# Patient Record
Sex: MALE | Race: WHITE | NOT HISPANIC OR LATINO | Employment: OTHER | URBAN - METROPOLITAN AREA
[De-identification: names, ages, dates, MRNs, and addresses within clinical notes are randomized per-mention and may not be internally consistent; named-entity substitution may affect disease eponyms.]

---

## 2018-04-03 ENCOUNTER — TRANSCRIBE ORDERS (OUTPATIENT)
Dept: ADMINISTRATIVE | Facility: HOSPITAL | Age: 68
End: 2018-04-03

## 2018-04-03 DIAGNOSIS — R10.812 LEFT UPPER QUADRANT ABDOMINAL TENDERNESS WITHOUT REBOUND TENDERNESS: ICD-10-CM

## 2018-04-03 DIAGNOSIS — R10.813 RIGHT LOWER QUADRANT ABDOMINAL TENDERNESS WITHOUT REBOUND TENDERNESS: ICD-10-CM

## 2018-04-03 DIAGNOSIS — R19.8 ABDOMINAL FULLNESS: ICD-10-CM

## 2018-04-03 DIAGNOSIS — R10.31 RIGHT LOWER QUADRANT ABDOMINAL PAIN: ICD-10-CM

## 2018-04-03 DIAGNOSIS — R10.32 LEFT LOWER QUADRANT PAIN: Primary | ICD-10-CM

## 2018-04-10 ENCOUNTER — HOSPITAL ENCOUNTER (OUTPATIENT)
Dept: CT IMAGING | Facility: HOSPITAL | Age: 68
Discharge: HOME | End: 2018-04-10
Admitting: INTERNAL MEDICINE

## 2018-04-10 DIAGNOSIS — R10.813 RIGHT LOWER QUADRANT ABDOMINAL TENDERNESS WITHOUT REBOUND TENDERNESS: ICD-10-CM

## 2018-04-10 DIAGNOSIS — R10.32 LEFT LOWER QUADRANT PAIN: ICD-10-CM

## 2018-04-10 DIAGNOSIS — R19.8 ABDOMINAL FULLNESS: ICD-10-CM

## 2018-04-10 DIAGNOSIS — R10.812 LEFT UPPER QUADRANT ABDOMINAL TENDERNESS WITHOUT REBOUND TENDERNESS: ICD-10-CM

## 2018-04-10 DIAGNOSIS — R10.31 RIGHT LOWER QUADRANT ABDOMINAL PAIN: ICD-10-CM

## 2018-04-10 LAB — CREAT BLDA-MCNC: 1.2 MG/DL (ref 0.6–1.3)

## 2018-04-10 PROCEDURE — 82565 ASSAY OF CREATININE: CPT

## 2018-04-10 PROCEDURE — 25010000002 IOPAMIDOL 61 % SOLUTION: Performed by: INTERNAL MEDICINE

## 2018-04-10 PROCEDURE — 74177 CT ABD & PELVIS W/CONTRAST: CPT

## 2018-04-10 RX ADMIN — IOPAMIDOL 85 ML: 612 INJECTION, SOLUTION INTRAVENOUS at 08:05

## 2018-04-18 ENCOUNTER — TRANSCRIBE ORDERS (OUTPATIENT)
Dept: ADMINISTRATIVE | Facility: HOSPITAL | Age: 68
End: 2018-04-18

## 2018-04-18 DIAGNOSIS — R93.89 ABNORMAL CT SCAN: Primary | ICD-10-CM

## 2018-05-10 ENCOUNTER — TELEPHONE (OUTPATIENT)
Dept: ORTHOPEDIC SURGERY | Facility: CLINIC | Age: 68
End: 2018-05-10

## 2018-05-18 ENCOUNTER — OFFICE VISIT (OUTPATIENT)
Dept: ORTHOPEDIC SURGERY | Facility: CLINIC | Age: 68
End: 2018-05-18

## 2018-05-18 VITALS — TEMPERATURE: 97.6 F | HEIGHT: 70 IN | BODY MASS INDEX: 24.34 KG/M2 | WEIGHT: 170 LBS

## 2018-05-18 DIAGNOSIS — M47.812 NECK ARTHRITIS: ICD-10-CM

## 2018-05-18 DIAGNOSIS — M25.512 CHRONIC LEFT SHOULDER PAIN: Primary | ICD-10-CM

## 2018-05-18 DIAGNOSIS — G89.29 CHRONIC LEFT SHOULDER PAIN: Primary | ICD-10-CM

## 2018-05-18 PROCEDURE — 72040 X-RAY EXAM NECK SPINE 2-3 VW: CPT | Performed by: ORTHOPAEDIC SURGERY

## 2018-05-18 PROCEDURE — 99203 OFFICE O/P NEW LOW 30 MIN: CPT | Performed by: ORTHOPAEDIC SURGERY

## 2018-05-18 PROCEDURE — 73030 X-RAY EXAM OF SHOULDER: CPT | Performed by: ORTHOPAEDIC SURGERY

## 2018-05-18 RX ORDER — HEPATITIS A VACCINE 1440 [IU]/ML
INJECTION, SUSPENSION INTRAMUSCULAR
COMMUNITY
Start: 2018-05-11 | End: 2018-07-13

## 2018-05-18 RX ORDER — FINASTERIDE 5 MG/1
5 TABLET, FILM COATED ORAL DAILY
COMMUNITY
Start: 2016-01-05

## 2018-05-21 NOTE — PROGRESS NOTES
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     No current facility-administered medications on file prior to visit.      Current Medications:  Scheduled Meds:  Continuous Infusions:  No current facility-administered medications for this visit.   PRN Meds:.    History reviewed. No pertinent past medical history.   History reviewed. No pertinent surgical history.     Social History     Occupational History   • Not on file.     Social History Main Topics   • Smoking status: Not on file   • Smokeless tobacco: Not on file   • Alcohol use Not on file   • Drug use: Unknown   • Sexual activity: Not on file    Social History     Social History Narrative   • No narrative on file      History reviewed. No pertinent family history.          Review of Systems: 14 point review of systems are remarkable for the pertinent positives listed in the chart by the patient the remainder are negative    Review of Systems      Physical Exam: 68 y.o. male  General Appearance:    Alert, cooperative, in no acute distress                 Vitals:     "05/18/18 1344   Temp: 97.6 °F (36.4 °C)   Weight: 77.1 kg (170 lb)   Height: 177.8 cm (70\")      Patient is alert and read ×3 no acute distress appears her above-listed at height weight and age.  Affect is normal respiratory rate is normal unlabored. Heart rate regular rate rhythm, sclera, dentition and hearing are normal for the purpose of this exam.    Ortho Exam Physical exam of the left shoulder reveals no overlying skin changes no lymphedema no lymphadenopathy.  The patient can actively flex to 180, abduction is similar external rotation is to 50, internal rotation to the upper lumbar spine.  Rotator cuff strength is 4+ to 5 over 5 with isometric strength testing without symptoms.  The patient hasa normal elbow exam.  Patient has good distal pulses  He does has some decrease in cervical range of motion in all directions with some reproduction of his left upper extremity pain he has a negative Spurling's he is neurologically intact guarded many muscle test which is 5 over 5 sensation appears to be intact with exception of some subjective tingling no overlying skin changes  Procedures          Radiology:   AP, Scapular Y and Axillary Lateral of the left shoulder were ordered/reviewed to evauate shoulder pain.  I've no comparative films he has some mild acromial clavicular arthritis no other acute bony pathology is seen AP and lateral the cervical spine were also taken to evaluate his symptoms have no comparative films he has marked degenerative changes in the cervical spine no obvious listhesis  Imaging Results (most recent)     Procedure Component Value Units Date/Time    XR Spine Cervical 2 View [[id number]] Resulted:  05/18/18 1449     Updated:  05/18/18 1449    Impression:       Ordering physician's impression is located in the Encounter Note dated 05/18/18. X-ray performed in the DR room.      XR Shoulder 2+ View Left [[id number]] Resulted:  05/18/18 1344     Updated:  05/18/18 1344    Impression:       " Ordering physician's impression is located in the Encounter Note dated 05/18/18. X-ray performed in the DR room.          Assessment/Plan:Left upper extremity pain I really think this is coming from his neck plan is to get him on a therapeutic dose of anti-inflammatories I will start him into some physical therapy and I will also have him see Dr. Joel

## 2018-05-25 ENCOUNTER — ANESTHESIA (OUTPATIENT)
Dept: GASTROENTEROLOGY | Facility: HOSPITAL | Age: 68
End: 2018-05-25

## 2018-05-25 ENCOUNTER — HOSPITAL ENCOUNTER (OUTPATIENT)
Facility: HOSPITAL | Age: 68
Setting detail: HOSPITAL OUTPATIENT SURGERY
Discharge: HOME | End: 2018-05-25
Attending: INTERNAL MEDICINE | Admitting: INTERNAL MEDICINE

## 2018-05-25 ENCOUNTER — PREP FOR SURGERY (OUTPATIENT)
Dept: OTHER | Facility: HOSPITAL | Age: 68
End: 2018-05-25

## 2018-05-25 ENCOUNTER — ANESTHESIA EVENT (OUTPATIENT)
Dept: GASTROENTEROLOGY | Facility: HOSPITAL | Age: 68
End: 2018-05-25

## 2018-05-25 VITALS
OXYGEN SATURATION: 100 % | SYSTOLIC BLOOD PRESSURE: 129 MMHG | BODY MASS INDEX: 23.25 KG/M2 | RESPIRATION RATE: 18 BRPM | DIASTOLIC BLOOD PRESSURE: 82 MMHG | HEART RATE: 74 BPM | TEMPERATURE: 97.8 F | HEIGHT: 70 IN | WEIGHT: 162.38 LBS

## 2018-05-25 DIAGNOSIS — Z12.11 SCREENING FOR COLON CANCER: Primary | ICD-10-CM

## 2018-05-25 PROCEDURE — G0121 COLON CA SCRN NOT HI RSK IND: HCPCS | Performed by: INTERNAL MEDICINE

## 2018-05-25 PROCEDURE — 25010000002 PROPOFOL 10 MG/ML EMULSION: Performed by: ANESTHESIOLOGY

## 2018-05-25 RX ORDER — PROPOFOL 10 MG/ML
VIAL (ML) INTRAVENOUS CONTINUOUS PRN
Status: DISCONTINUED | OUTPATIENT
Start: 2018-05-25 | End: 2018-05-25 | Stop reason: SURG

## 2018-05-25 RX ORDER — PROPOFOL 10 MG/ML
VIAL (ML) INTRAVENOUS AS NEEDED
Status: DISCONTINUED | OUTPATIENT
Start: 2018-05-25 | End: 2018-05-25 | Stop reason: SURG

## 2018-05-25 RX ORDER — SODIUM CHLORIDE, SODIUM LACTATE, POTASSIUM CHLORIDE, CALCIUM CHLORIDE 600; 310; 30; 20 MG/100ML; MG/100ML; MG/100ML; MG/100ML
30 INJECTION, SOLUTION INTRAVENOUS CONTINUOUS PRN
Status: DISCONTINUED | OUTPATIENT
Start: 2018-05-25 | End: 2018-05-25 | Stop reason: HOSPADM

## 2018-05-25 RX ORDER — LIDOCAINE HYDROCHLORIDE 20 MG/ML
INJECTION, SOLUTION INFILTRATION; PERINEURAL AS NEEDED
Status: DISCONTINUED | OUTPATIENT
Start: 2018-05-25 | End: 2018-05-25 | Stop reason: SURG

## 2018-05-25 RX ORDER — SODIUM CHLORIDE 0.9 % (FLUSH) 0.9 %
1-10 SYRINGE (ML) INJECTION AS NEEDED
Status: DISCONTINUED | OUTPATIENT
Start: 2018-05-25 | End: 2018-05-25 | Stop reason: HOSPADM

## 2018-05-25 RX ADMIN — SODIUM CHLORIDE, POTASSIUM CHLORIDE, SODIUM LACTATE AND CALCIUM CHLORIDE 30 ML/HR: 600; 310; 30; 20 INJECTION, SOLUTION INTRAVENOUS at 07:37

## 2018-05-25 RX ADMIN — LIDOCAINE HYDROCHLORIDE 60 MG: 20 INJECTION, SOLUTION INFILTRATION; PERINEURAL at 09:01

## 2018-05-25 RX ADMIN — PROPOFOL 130 MG: 10 INJECTION, EMULSION INTRAVENOUS at 09:01

## 2018-05-25 RX ADMIN — PROPOFOL 120 MCG/KG/MIN: 10 INJECTION, EMULSION INTRAVENOUS at 09:02

## 2018-05-25 NOTE — ANESTHESIA POSTPROCEDURE EVALUATION
"Patient: Sandro Galindo    Procedure Summary     Date:  05/25/18 Room / Location:  [facility] ENDOSCOPY 6 / [facility] ENDOSCOPY    Anesthesia Start:  0859 Anesthesia Stop:  0917    Procedure:  COLONOSCOPY TO CECUM AND TERMINAL ILEUM (N/A ) Diagnosis:  Screening for colon cancer    Surgeon:  Frandy Flowers MD Provider:  Pasquale Muir MD    Anesthesia Type:  MAC ASA Status:  2          Anesthesia Type: MAC  Last vitals  BP   105/60 (05/25/18 0918)   Temp   36.6 °C (97.8 °F) (05/25/18 0918)   Pulse   76 (05/25/18 0918)   Resp   18 (05/25/18 0918)     SpO2   99 % (05/25/18 0918)     Post Anesthesia Care and Evaluation    Patient location during evaluation: bedside  Patient participation: complete - patient participated  Level of consciousness: awake  Pain management: adequate  Airway patency: patent  Anesthetic complications: No anesthetic complications    Cardiovascular status: acceptable  Respiratory status: acceptable  Hydration status: acceptable    Comments: /60 (BP Location: Left arm, Patient Position: Lying)   Pulse 76   Temp 36.6 °C (97.8 °F) (Oral)   Resp 18   Ht 177.8 cm (70\")   Wt 73.7 kg (162 lb 6 oz)   SpO2 99%   BMI 23.30 kg/m²         "

## 2018-05-25 NOTE — H&P
"Patient Care Team:  Rick Garza Jr., MD as PCP - General    CHIEF COMPLAINT: Screening for CRC    HISTORY OF PRESENT ILLNESS:    Last exam >10years, no Family history      Past Medical History:   Diagnosis Date   • Arthritis    • Vitamin D deficiency      Past Surgical History:   Procedure Laterality Date   • COLONOSCOPY     • CYST REMOVAL       History reviewed. No pertinent family history.  Social History   Substance Use Topics   • Smoking status: Never Smoker   • Smokeless tobacco: Not on file   • Alcohol use Yes      Comment: social     Prescriptions Prior to Admission   Medication Sig Dispense Refill Last Dose   • Cholecalciferol (VITAMIN D PO) Take 1,000 mg by mouth.   5/23/2018   • finasteride (PROSCAR) 5 MG tablet Take 5 mg by mouth Daily.   5/23/2018   • HAVRIX 1440 EL U/ML vaccine    5/4/2018   • Multiple Vitamins-Minerals (MENS 50+ MULTI VITAMIN/MIN PO) Take  by mouth.   5/23/2018     Allergies:  Sulfa antibiotics    REVIEW OF SYSTEMS:  Please see the above history of present illness for pertinent positives and negatives.  The remainder of the patient's systems have been reviewed and are negative.     Vital Signs  Temp:  [97.8 °F (36.6 °C)] 97.8 °F (36.6 °C)  Heart Rate:  [64] 64  Resp:  [16] 16  BP: (139)/(74) 139/74    Flowsheet Rows      First Filed Value   Admission Height  177.8 cm (70\") Documented at 05/25/2018 0728   Admission Weight  73.7 kg (162 lb 6 oz) Documented at 05/25/2018 0728           Physical Exam:  Physical Exam   Constitutional: Patient appears well-developed and well-nourished and in no acute distress   HEENT:   Head: Normocephalic and atraumatic.   Eyes:  Pupils are equal, round, and reactive to light. EOM are intact. Sclera are anicteric and non-injected.  Mouth and Throat: Patient has moist mucous membranes. Oropharynx is clear of any erythema or exudate.     Neck: Neck supple. No JVD present. No thyromegaly present. No lymphadenopathy present.  Cardiovascular: Regular rate, " regular rhythm, S1 normal and S2 normal.  Exam reveals no gallop and no friction rub.  No murmur heard.  Pulmonary/Chest: Lungs are clear to auscultation bilaterally. No respiratory distress. No wheezes. No rhonchi. No rales.   Abdominal: Soft. Bowel sounds are normal. No distension and no mass. There is no hepatosplenomegaly. There is no tenderness.   Musculoskeletal: Normal Muscle tone  Extremities: No edema. Pulses are palpable in all 4 extremities.  Neurological: Patient is alert and oriented to person, place, and time. Cranial nerves II-XII are grossly intact with no focal deficits.  Skin: Skin is warm. No rash noted. Nails show no clubbing.  No cyanosis or erythema.     Results Review:    I reviewed the patient's new clinical results.  Lab Results (most recent)     None          Imaging Results (most recent)     None        reviewed    ECG/EMG Results (most recent)     None        reviewed    Assessment/Plan     Screening for CRC/Colonoscopy    I discussed the patients findings and my recommendations with patient.     Frandy Flowers MD  05/25/18  8:44 AM    Time: 10 min prior to procedure.

## 2018-05-25 NOTE — ANESTHESIA PREPROCEDURE EVALUATION
Anesthesia Evaluation     Patient summary reviewed and Nursing notes reviewed   no history of anesthetic complications:  NPO Solid Status: > 6 hours  NPO Liquid Status: > 6 hours           Airway   Mallampati: II  TM distance: >3 FB  Neck ROM: full  no difficulty expected and No difficulty expected  Dental - normal exam     Pulmonary - negative pulmonary ROS and normal exam    breath sounds clear to auscultation  (-) rhonchi, decreased breath sounds, wheezes, rales, stridor  Cardiovascular - negative cardio ROS and normal exam    NYHA Classification: I  Rhythm: regular  Rate: normal    (-) murmur, weak pulses, friction rub, systolic click, carotid bruits, JVD, peripheral edema      Neuro/Psych- negative ROS  GI/Hepatic/Renal/Endo - negative ROS     Musculoskeletal     Abdominal  - normal exam    Abdomen: soft.   Substance History - negative use     OB/GYN negative ob/gyn ROS         Other   (+) arthritis                     Anesthesia Plan    ASA 2     MAC     intravenous induction   Anesthetic plan and risks discussed with patient.

## 2018-05-25 NOTE — BRIEF OP NOTE
COLONOSCOPY  Progress Note    Sandro Galindo  5/25/2018    Pre-op Diagnosis:   Screening for CRC       Post-Op Diagnosis Codes:     * Screening for colon cancer [Z12.11]    Procedure/CPT® Codes:      Procedure(s):  COLONOSCOPY TO CECUM AND TERMINAL ILEUM    Surgeon(s):  Frandy Flowers MD    Anesthesia: Monitor Anesthesia Care    Staff:   Endo Technician: Dafne Gilliam  Endo Nurse: Oralia Chen RN    Estimated Blood Loss: none    Urine Voided: * No values recorded between 5/25/2018  8:45 AM and 5/25/2018  9:13 AM *    Specimens:                None      Drains:      Findings: Colon to TI good Prep  Normal Mucosa    Complications: none      Frandy Flowers MD     Date: 5/25/2018  Time: 9:14 AM

## 2018-05-25 NOTE — DISCHARGE INSTRUCTIONS
For the next 24 hours patient needs to be with a responsible adult.    For 24 hours DO NOT drive, operate machinery, appliances, drink alcohol, make important decisions or sign legal documents.    Start with a light or bland diet and advance to regular diet as tolerated.    Follow recommendations on procedure report provided by your doctor.    Call Dr Flowers for problems [phone]    Problems may include but not limited to: large amounts of bleeding, trouble breathing, repeated vomiting, severe unrelieved pain, fever or chills.

## 2018-06-08 ENCOUNTER — HOSPITAL ENCOUNTER (OUTPATIENT)
Dept: PHYSICAL THERAPY | Facility: HOSPITAL | Age: 68
Setting detail: THERAPIES SERIES
Discharge: HOME | End: 2018-06-08
Attending: ORTHOPAEDIC SURGERY

## 2018-06-08 DIAGNOSIS — G89.29 CHRONIC LEFT SHOULDER PAIN: Primary | ICD-10-CM

## 2018-06-08 DIAGNOSIS — M25.512 CHRONIC LEFT SHOULDER PAIN: Primary | ICD-10-CM

## 2018-06-08 PROCEDURE — G8982 BODY POS GOAL STATUS: HCPCS | Performed by: PHYSICAL THERAPIST

## 2018-06-08 PROCEDURE — 97110 THERAPEUTIC EXERCISES: CPT | Performed by: PHYSICAL THERAPIST

## 2018-06-08 PROCEDURE — G8981 BODY POS CURRENT STATUS: HCPCS | Performed by: PHYSICAL THERAPIST

## 2018-06-08 PROCEDURE — 97161 PT EVAL LOW COMPLEX 20 MIN: CPT | Performed by: PHYSICAL THERAPIST

## 2018-06-08 NOTE — THERAPY EVALUATION
Outpatient Physical Therapy Ortho Initial Evaluation  [facility]     Patient Name: Sandro Galindo  : 1950  MRN: [id number]  Today's Date: 2018      Visit Date: 2018    There is no problem list on file for this patient.       Past Medical History:   Diagnosis Date   • Arthritis    • Vitamin D deficiency         Past Surgical History:   Procedure Laterality Date   • COLONOSCOPY     • COLONOSCOPY N/A 2018    Procedure: COLONOSCOPY TO CECUM AND TERMINAL ILEUM;  Surgeon: Frandy Flowers MD;  Location: [facility] ENDOSCOPY;  Service: Gastroenterology   • CYST REMOVAL         Visit Dx:     ICD-10-CM ICD-9-CM   1. Chronic left shoulder pain M25.512 719.41    G89.29 338.29             Patient History     Row Name 18 1300             History    Chief Complaint Difficulty with daily activities;Pain;Muscle weakness  -      Type of Pain Shoulder pain   LEFT  -      Date Current Problem(s) Began 16  -      Brief Description of Current Complaint Pt is a 68 y.o. male who presents to PT with L shoulder pain with insidious onset 2 years ago. 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 He indicates feeling of cramping and weakness in LUE. He reports that pain and weakness is intermitten in episodes of exacerbation. He reports that MD indicated that she thinks pain could be referred from arthritis in neck. he reports pain work related movements, but AROM is pain free in all planes.  -LC      Patient/Caregiver Goals Relieve pain;Return to prior level of function;Improve mobility;Improve strength;Know what to do to help the symptoms  -      Patient's Rating of General Health Very good  -LC      Hand Dominance right-handed  -LC      Occupation/sports/leisure activities dentist, golf, travelling  -LC         Pain     Pain Location Shoulder   LEFT  -LC      Pain at Present 1   -LC      Pain at Best 0  -LC      Pain at Worst 5  -LC      Pain Frequency Intermittent  -LC      Pain Description Aching;Sharp;Sore  -LC      What Performance Factors Make the Current Problem(s) WORSE? pushing, pulling, sustained hold with LUE  -LC      Difficulties at work? pushing, pulling, sustained hold with LUE  -LC         Fall Risk Assessment    Any falls in the past year: No  -LC         Daily Activities    Primary Language English  -LC      Pt Participated in POC and Goals Yes  -LC         Safety    Are you being hurt, hit, or frightened by anyone at home or in your life? No  -LC      Are you being neglected by a caregiver No  -LC        User Key  (r) = Recorded By, (t) = Taken By, (c) = Cosigned By    Initials Name Provider Type    LC Richard Benites, PT DPT Physical Therapist                PT Ortho     Row Name 06/08/18 1300       Cervical/Thoracic Special Tests    Spurlings (Foraminal Compression) Negative  -LC    Candie's Test (TOS) Bilateral:;Negative  -LC    Phalen's Test (Carpal Tunnel Syndrome) Bilateral:;Negative  -LC       Upper Level Neural Tension Tests    Median Neural Tension Test Left:;Negative  -LC       Shoulder Impingement/Rotator Cuff Special Tests    Mckeon-Trey Test (RC Lesion vs. Bursitis) Left:;Negative  -LC    Empty Can Test (RC Lesion) Left:;Negative  -LC    Lift-Off Test (Subscapularis Lesion) Left:;Negative  -LC       General ROM    RT Upper Ext Comments  -LC    LT Upper Ext Comment  -LC       Right Upper Ext    Rt Upper Extremity Comments  AROM WNL  -LC       Left Upper Ext    Lt Upper Extremity Comments  AROM pain free WNL  -LC       Left Shoulder (Manual Muscle Testing)    Left Shoulder Manual Muscle Testing (MMT) flexion;abduction;external rotation;internal rotation  -LC    MMT: Flexion, Left Shoulder flexion  -LC    MMT, Gross Movement: Left Shoulder Flexion (4/5) good  -LC    MMT: ABduction, Left Shoulder abduction  -LC    MMT, Gross Movement: Left Shoulder ABduction (4/5)  good  -LC    MMT: Internal Rotation, Left Shoulder internal rotation  -LC    MMT, Gross Movement: Left Shoulder Internal Rotation (4/5) good  -LC    MMT: External Rotation, Left Shoulder external rotation  -LC    MMT, Gross Movement: Left Shoulder External Rotation (4/5) good  -LC       Right Shoulder (Manual Muscle Testing)    Right Shoulder Manual Muscle Testing (MMT) flexion;abduction;external rotation;internal rotation  -LC    MMT: Flexion, Right Shoulder flexion  -LC    MMT, Gross Movement: Right Shoulder Flexion (4/5) good  -LC    MMT: ABduction, Right Shoulder abduction  -LC    MMT, Gross Movement: Right Shoulder ABduction (4/5) good  -LC    MMT: Internal Rotation, Right Shoulder internal rotation  -LC    MMT, Gross Movement: Right Shoulder Internal Rotation (4/5) good  -LC    MMT: External Rotation, Right Shoulder external rotation  -LC    MMT, Gross Movement: Right Shoulder External Rotation (4/5) good  -LC       Sensation    Sensation WNL? WNL  -      User Key  (r) = Recorded By, (t) = Taken By, (c) = Cosigned By    Initials Name Provider Type    CARYN Benites, PT DPT Physical Therapist                      Therapy Education  Given: HEP, Symptoms/condition management, Pain management, Posture/body mechanics  Program: New  How Provided: Verbal, Demonstration, Written  Provided to: Patient  Level of Understanding: Teach back education performed, Verbalized, Demonstrated           PT OP Goals     Row Name 06/08/18 1400          PT Short Term Goals    STG 1 Pt will be independent with HEP to improve posture and improve cervical and thoracic mobility with work activities as well as strengthen and protect shoulder to alleviate pain.  -     STG 1 Progress New  -     STG 2 Pt will report no referred pain into LUE passing L elbow.  -     STG 2 Progress New  -        Time Calculation    PT Goal Re-Cert Due Date 07/09/18  -       User Key  (r) = Recorded By, (t) = Taken By, (c) = Cosigned By     Initials Name Provider Type     Richard Benites, PT DPT Physical Therapist                PT Assessment/Plan     Row Name 06/08/18 1427          PT Assessment    Functional Limitations Performance in work activities;Limitations in functional capacity and performance  -     Impairments Pain;Poor body mechanics;Impaired flexibility  -     Assessment Comments Pt is a 68 y.o. male who presents to PT with L shoulder pain with insidious onset 2 years ago. 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 He indicates feeling of cramping and weakness in LUE. He reports that pain and weakness is intermitten in episodes of exacerbation. He reports that MD indicated that she thinks pain could be referred from arthritis in neck. he reports pain work related movements, but AROM is pain free in all planes. He has LUE AROM WNL with pain free movement. He has LUE MMT grossly 4/5 pain free. Pt has negative special tests for cervical or L shoulder pathology. His reported pain at present is 1/10. Pt was educated on HEP for improving cervical and L shoulder mobility as well as strengthening postural muscles to encourage good position while performing work duties. Pt performed all therex pain free. He was issued written copy of HEP.  -     Please refer to paper survey for additional self-reported information Yes  -LC     Rehab Potential Good  -LC     Patient/caregiver participated in establishment of treatment plan and goals Yes  -LC     Patient would benefit from skilled therapy intervention Yes  -LC        PT Plan    PT Frequency 1x/week  -     Predicted Duration of Therapy Intervention (OT Eval) 2 weeks  -     Planned CPT's? PT EVAL LOW COMPLEXITY: 22308;PT RE-EVAL: 44323;PT NEUROMUSC RE-EDUCATION EA 15 MIN: 03305;PT MANUAL THERAPY EA 15 MIN: 49395;PT THER PROC EA 15 MIN: 64927;PT THER ACT EA 15 MIN: 44469;PT ULTRASOUND  EA 15 MIN: 88065;PT ELECTRICAL STIM UNATTEND: ;PT HOT OR COLD PACK TREAT MCARE  -LC     Physical Therapy Interventions (Optional Details) home exercise program;patient/family education;manual therapy techniques;modalities;stretching;strengthening;ROM (Range of Motion)  -     PT Plan Comments Pt requires skilled therapy to improved posture and protect cervical spine and L shoulder during work related activities by strengthening and stretching affected areas.  -LC       User Key  (r) = Recorded By, (t) = Taken By, (c) = Cosigned By    Initials Name Provider Type    CARYN Benites, PT DPT Physical Therapist                  Exercises     Row Name 06/08/18 1400             Exercise 1    Exercise Name 1 rhomboid stretch on doorframe  -LC      Sets 1 1  -LC      Reps 1 6  -LC      Time 1 10  -LC         Exercise 2    Exercise Name 2 pec stretch on dooframe  -LC      Sets 2 1  -LC      Reps 2 6  -LC      Time 2 10  -LC         Exercise 3    Exercise Name 3 high row  -LC      Sets 3 2  -LC      Reps 3 8  -LC      Additional Comments BtB  -LC         Exercise 4    Exercise Name 4 horizontal ABD  -LC      Sets 4 2  -LC      Reps 4 8  -LC      Additional Comments BTB  -LC         Exercise 5    Exercise Name 5 cat and cow  -LC      Sets 5 2  -LC      Reps 5 10  -LC         Exercise 6    Exercise Name 6 child's pose R and L  -LC      Sets 6 1  -LC      Reps 6 5  -LC      Time 6 10  -LC        User Key  (r) = Recorded By, (t) = Taken By, (c) = Cosigned By    Initials Name Provider Type    CARYN Benites, PT DPT Physical Therapist                        Outcome Measure Options: Neck Disability Index (NDI)  Neck Disability Index  Neck Disability Index Comments: 12%      Time Calculation:   Start Time: 1345  Stop Time: 1425  Time Calculation (min): 40 min  Total Timed Code Minutes- PT: 40 minute(s)     Therapy Charges for Today     Code Description Service Date Service Provider Modifiers Qty    [id number]  PT CHNG  MAIN POS CURRENT 6/8/2018 Richard Benites, PT DPT GP, CI 1    95059640702 HC PT CHNG MAIN POS PROJECTED 6/8/2018 Richard Benites, PT DPT GP, CH 1    [id number] HC PT THER PROC EA 15 MIN 6/8/2018 Richard Benites, PT DPT GP 2    [id number] HC PT EVAL LOW COMPLEXITY 1 6/8/2018 Richard Benites, PT DPT GP 1          PT G-Codes  PT Professional Judgement Used?: Yes  Outcome Measure Options: Neck Disability Index (NDI)  Score: 12%  Functional Limitation: Changing and maintaining body position  Changing and Maintaining Body Position Current Status (): At least 1 percent but less than 20 percent impaired, limited or restricted  Changing and Maintaining Body Position Goal Status (): 0 percent impaired, limited or restricted         Richard Benites, PT DPT  6/8/2018

## 2018-06-12 ENCOUNTER — CONSULT (OUTPATIENT)
Dept: ORTHOPEDIC SURGERY | Facility: CLINIC | Age: 68
End: 2018-06-12

## 2018-06-12 ENCOUNTER — TELEPHONE (OUTPATIENT)
Dept: ORTHOPEDIC SURGERY | Facility: CLINIC | Age: 68
End: 2018-06-12

## 2018-06-12 ENCOUNTER — HOSPITAL ENCOUNTER (OUTPATIENT)
Dept: PHYSICAL THERAPY | Facility: HOSPITAL | Age: 68
Setting detail: THERAPIES SERIES
Discharge: HOME | End: 2018-06-12
Attending: ORTHOPAEDIC SURGERY

## 2018-06-12 VITALS — WEIGHT: 165 LBS | BODY MASS INDEX: 23.62 KG/M2 | HEIGHT: 70 IN | TEMPERATURE: 97.9 F

## 2018-06-12 DIAGNOSIS — G89.29 CHRONIC LEFT SHOULDER PAIN: Primary | ICD-10-CM

## 2018-06-12 DIAGNOSIS — M47.22 CERVICAL SPONDYLOSIS WITH RADICULOPATHY: Primary | ICD-10-CM

## 2018-06-12 DIAGNOSIS — M25.512 CHRONIC LEFT SHOULDER PAIN: Primary | ICD-10-CM

## 2018-06-12 PROCEDURE — 97140 MANUAL THERAPY 1/> REGIONS: CPT

## 2018-06-12 PROCEDURE — 99214 OFFICE O/P EST MOD 30 MIN: CPT | Performed by: ORTHOPAEDIC SURGERY

## 2018-06-12 PROCEDURE — 97110 THERAPEUTIC EXERCISES: CPT

## 2018-06-12 NOTE — PROGRESS NOTES
New patient or new problem visit    Chief Complaint   Patient presents with   • Cervical Spine - Establish Care, Pain       HPI: He complains of about a 2 year history of slowly progressive left shoulder and arm pain.  He thinks it may have started following a relatively cold weather golf trip.  He noted tingling in the left arm edema and cramping in his left hand and the pain progressed up to the shoulder.  He has almost no neck pain.  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  He started physical therapy last week and the has seen no positive effects from that yet.  He denies bowel or bladder complaints or balance difficulties.    PFSH: See chart- reviewed    Review of Systems   Musculoskeletal: Positive for arthralgias, neck pain and neck stiffness.   Neurological: Negative for dizziness, weakness, numbness and headaches.       PE: Constitutional: Vital signs above-noted.  Awake, alert and oriented    Psychiatric: Affect and insight do not appear grossly disturbed.    Pulmonary: Breathing is unlabored, color is good.    Skin: Warm, dry and normal turgor    Cardiac:  radial pulses intact.  No arm edema.    Eyesight and hearing appear adequate for examination purposes    Musculoskeletal:  Posture is unremarkable to coronal and sagittal inspection.  Motion appears white stiff in all planes of motion.  The skin about the area is intact.  There is no palpable or visible deformity.  There is no local spasm. There is no tenderness to percussion and palpation of the spine.     Neurologic:  In the bilateral upper extremities there is no evidence of atrophy.  Motor function is undisturbed in shoulder abduction, elbow flexion, wrist extension, finger extension, triceps extension, or finger abduction   .  Sensation appears symmetrically intact to light touch   .  Reflexes are 2+ and symmetrical in the biceps, brachioradialis, and triceps on the right but on the left I cannot obtain a  brachioradialis reflex. Munoz test is negative.  Gait appears undisturbed.  Spurling test is negative.      MEDICAL DECISION MAKING    XRAY: Plain film x-rays of cervical spine show multilevel spondylosis but well-maintained lordosis.  No comparison views are available.    Other: n/a    Impression: Cervical spondylosis with radiculopathy.    Plan: Commended MRI for further evaluation.  I will call him with results.

## 2018-06-12 NOTE — THERAPY TREATMENT NOTE
Outpatient Physical Therapy Ortho Treatment Note  [facility]     Patient Name: Sandro Galindo  : 1950  MRN: [id number]  Today's Date: 2018      Visit Date: 2018    Visit Dx:    ICD-10-CM ICD-9-CM   1. Chronic left shoulder pain M25.512 719.41    G89.29 338.29       There is no problem list on file for this patient.       Past Medical History:   Diagnosis Date   • Arthritis    • Vitamin D deficiency         Past Surgical History:   Procedure Laterality Date   • COLONOSCOPY     • COLONOSCOPY N/A 2018    Procedure: COLONOSCOPY TO CECUM AND TERMINAL ILEUM;  Surgeon: Frandy Flowers MD;  Location: [facility] ENDOSCOPY;  Service: Gastroenterology   • CYST REMOVAL               PT Ortho     Row Name 18 1200       Subjective Comments    Subjective Comments upper trap area he points is wear has pain towards end of work day  -SI       Subjective Pain    Able to rate subjective pain? yes  -SI    Pre-Treatment Pain Level 0  -SI    Post-Treatment Pain Level 0  -SI    Subjective Pain Comment no pain at time of tx, slight feel it with ER side ex  -SI       Right Upper Ext    Rt Upper Extremity Comments  --   ER 80 passive, flexion 167  -SI       Left Upper Ext    Lt Upper Extremity Comments  ER 75 passive after ex, [id number] after ex  -SI      User Key  (r) = Recorded By, (t) = Taken By, (c) = Cosigned By    Initials Name Provider Type    VIKAS King PTA Physical Therapy Assistant                            PT Assessment/Plan     Row Name 18 1322          PT Assessment    Assessment Comments Pt with referral today with Dr. Lambert re: neck part of sx.  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 and body mechanics  -SI       User Key  (r) = Recorded By, (t) = Taken By, (c) = Cosigned By    Initials Name Provider Type    VIKAS King PTA Physical Therapy Assistant                Modalities     Row Name 18 1200             Ice     Patient reports will apply ice at home to involved area Yes   recommend use of ice after work  -SI        User Key  (r) = Recorded By, (t) = Taken By, (c) = Cosigned By    Initials Name Provider Type    VIKAS King PTA Physical Therapy Assistant                Exercises     Row Name 06/12/18 1200             Subjective Comments    Subjective Comments upper trap area he points is wear has pain towards end of work day  -SI         Subjective Pain    Able to rate subjective pain? yes  -SI      Pre-Treatment Pain Level 0  -SI      Post-Treatment Pain Level 0  -SI      Subjective Pain Comment no pain at time of tx, slight feel it with ER side ex  -SI         Exercise 1    Exercise Name 1 rhomboid stretch on doorframe  -SI      Sets 1 1  -SI      Reps 1 6  -SI      Time 1 10  -SI         Exercise 2    Exercise Name 2 pec stretch on dooframe  -SI      Sets 2 1  -SI      Reps 2 6  -SI      Time 2 10  -SI         Exercise 3    Exercise Name 3 scap ret and she ext with blue TB  -SI      Sets 3 2  -SI      Reps 3 10  -SI         Exercise 4    Exercise Name 4 horizontal ABD  -SI      Sets 4 2  -SI      Reps 4 8  -SI      Additional Comments -----------------------  -SI         Exercise 5    Exercise Name 5 cat and cow  -SI      Sets 5 2  -SI      Reps 5 10  -SI         Exercise 6    Exercise Name 6 supine ER with cane  -SI      Sets 6 1  -SI      Reps 6 10  -SI      Time 6 10  -SI         Exercise 7    Exercise Name 7 ER side to neutral  -SI      Sets 7 2  -SI      Reps 7 10  -SI      Additional Comments 2 lbs  -SI        User Key  (r) = Recorded By, (t) = Taken By, (c) = Cosigned By    Initials Name Provider Type    VIKAS King PTA Physical Therapy Assistant                             Therapy Education  Given: HEP, Posture/body mechanics  Program: Progressed  How Provided: Verbal, Demonstration, Written  Provided to: Patient  Level of Understanding: Teach back education performed              Time Calculation:    Start Time: 0715  Stop Time: 0800  Time Calculation (min): 45 min  Therapy Suggested Charges     Code   Minutes Charges    None           Therapy Charges for Today     Code Description Service Date Service Provider Modifiers Qty    37318165310  PT THER PROC EA 15 MIN 6/12/2018 Megan King PTA GP 2    [id number]  PT MANUAL THERAPY EA 15 MIN 6/12/2018 Megan King PTA GP 1                    Megan King PTA  6/12/2018

## 2018-06-18 ENCOUNTER — HOSPITAL ENCOUNTER (OUTPATIENT)
Dept: CT IMAGING | Facility: HOSPITAL | Age: 68
Discharge: HOME | End: 2018-06-18
Admitting: INTERNAL MEDICINE

## 2018-06-18 DIAGNOSIS — R93.89 ABNORMAL CT SCAN: ICD-10-CM

## 2018-06-18 LAB — CREAT BLDA-MCNC: 1.2 MG/DL (ref 0.6–1.3)

## 2018-06-18 PROCEDURE — 82565 ASSAY OF CREATININE: CPT

## 2018-06-18 PROCEDURE — 74177 CT ABD & PELVIS W/CONTRAST: CPT

## 2018-06-18 PROCEDURE — 25010000002 IOPAMIDOL 61 % SOLUTION: Performed by: INTERNAL MEDICINE

## 2018-06-18 RX ADMIN — IOPAMIDOL 85 ML: 612 INJECTION, SOLUTION INTRAVENOUS at 07:30

## 2018-06-19 ENCOUNTER — HOSPITAL ENCOUNTER (OUTPATIENT)
Dept: PHYSICAL THERAPY | Facility: HOSPITAL | Age: 68
Setting detail: THERAPIES SERIES
Discharge: HOME | End: 2018-06-19
Attending: ORTHOPAEDIC SURGERY

## 2018-06-19 DIAGNOSIS — G89.29 CHRONIC LEFT SHOULDER PAIN: Primary | ICD-10-CM

## 2018-06-19 DIAGNOSIS — M25.512 CHRONIC LEFT SHOULDER PAIN: Primary | ICD-10-CM

## 2018-06-19 PROCEDURE — 97140 MANUAL THERAPY 1/> REGIONS: CPT

## 2018-06-19 PROCEDURE — 97110 THERAPEUTIC EXERCISES: CPT

## 2018-06-19 NOTE — THERAPY TREATMENT NOTE
Outpatient Physical Therapy Ortho Treatment Note  [facility]     Patient Name: Sandro Galindo  : 1950  MRN: [id number]  Today's Date: 2018      Visit Date: 2018    Visit Dx:    ICD-10-CM ICD-9-CM   1. Chronic left shoulder pain M25.512 719.41    G89.29 338.29       There is no problem list on file for this patient.       Past Medical History:   Diagnosis Date   • Arthritis    • Vitamin D deficiency         Past Surgical History:   Procedure Laterality Date   • COLONOSCOPY     • COLONOSCOPY N/A 2018    Procedure: COLONOSCOPY TO CECUM AND TERMINAL ILEUM;  Surgeon: Frandy Flowers MD;  Location: [facility] ENDOSCOPY;  Service: Gastroenterology   • CYST REMOVAL               PT Ortho     Row Name 18 0700       Subjective Comments    Subjective Comments Saw Dr. Lambert and arthritis in neck and MRI ordered and scheduled 18.  When asked reports fair compliance with HEP and did not try ice after work to neck or shoulder since no pain  -SI       Subjective Pain    Able to rate subjective pain? yes  -SI    Pre-Treatment Pain Level 0  -SI    Post-Treatment Pain Level 0  -SI    Subjective Pain Comment Pt reports no neck pain and no shoulder pain  -SI       Posture/Observations    Alignment Options Thoracic kyphosis;Rounded shoulders;Forward head  -SI       General ROM    GENERAL ROM COMMENTS C flexion full, ext 50%, SB and rot 75%, no pain  -SI      User Key  (r) = Recorded By, (t) = Taken By, (c) = Cosigned By    Initials Name Provider Type    VIKAS King PTA Physical Therapy Assistant                            PT Assessment/Plan     Row Name 18 0759          PT Assessment    Assessment Comments No neck or shoulder pain and no radicular sx.  Poor posture and reviewed.  C-ROM limited and light stretching.  Pt wants to return for one more visit for review in 2 weeks (oot).  -SI       User Key  (r) = Recorded By, (t) = Taken By, (c) = Cosigned By    Initials Name  Provider Type    VIKAS King PTA Physical Therapy Assistant                    Exercises     Row Name 06/19/18 0700             Subjective Comments    Subjective Comments Saw Dr. Lambert and arthritis in neck and MRI ordered and scheduled 6-25-18.  When asked reports fair compliance with HEP and did not try ice after work to neck or shoulder since no pain  -SI         Subjective Pain    Able to rate subjective pain? yes  -SI      Pre-Treatment Pain Level 0  -SI      Post-Treatment Pain Level 0  -SI      Subjective Pain Comment Pt reports no neck pain and no shoulder pain  -SI         Exercise 1    Exercise Name 1 rhomboid stretch on doorframe  -SI      Sets 1 1  -SI      Reps 1 6  -SI      Time 1 10  -SI      Additional Comments -----------------  -SI         Exercise 2    Exercise Name 2 pec stretch on dooframe  -SI      Sets 2 1  -SI      Reps 2 6  -SI      Time 2 10  -SI      Additional Comments ----------------------  -SI         Exercise 3    Exercise Name 3 scap ret and she ext with blue TB  -SI      Sets 3 2  -SI      Reps 3 10  -SI         Exercise 5    Exercise Name 5 cat and cow  -SI      Sets 5 2  -SI      Reps 5 10  -SI      Additional Comments ----------------------------  -SI         Exercise 6    Exercise Name 6 supine ER with cane  -SI      Sets 6 1  -SI      Reps 6 10  -SI      Time 6 10  -SI         Exercise 7    Exercise Name 7 ER side to neutral  -SI      Sets 7 2  -SI      Reps 7 10  -SI      Additional Comments 2lbs  -SI         Exercise 8    Exercise Name 8 Scap ret and Sh ext with green TB  -SI      Sets 8 2  -SI      Reps 8 10  -SI         Exercise 9    Exercise Name 9 IR and ER with green TB  -SI      Sets 9 2  -SI      Reps 9 10  -SI         Exercise 10    Exercise Name 10 Chin tuck supine on 1.5 pillows  -SI      Sets 10 1  -SI      Reps 10 10  -SI         Exercise 11    Exercise Name 11 levator stretch  -SI      Sets 11 1  -SI      Reps 11 3  -SI      Time 11 20  -SI        User  Key  (r) = Recorded By, (t) = Taken By, (c) = Cosigned By    Initials Name Provider Type    VIKAS Megan King PTA Physical Therapy Assistant                        Manual Rx (last 36 hours)      Manual Treatments     Row Name 06/19/18 0700             Manual Rx 1    Manual Rx 1 Location Manual neck traction, PROM, and light stretching  -SI      Manual Rx 1 Duration 10  -SI        User Key  (r) = Recorded By, (t) = Taken By, (c) = Cosigned By    Initials Name Provider Type    VIKAS Megan King PTA Physical Therapy Assistant                PT OP Goals     Row Name 06/19/18 0700          PT Short Term Goals    STG 1 Pt will be independent with HEP to improve posture and improve cervical and thoracic mobility with work activities as well as strengthen and protect shoulder to alleviate pain.  -SI     STG 1 Progress Progressing  -SI     STG 2 Pt will report no referred pain into LUE passing L elbow.  -SI     STG 2 Progress Met  -SI       User Key  (r) = Recorded By, (t) = Taken By, (c) = Cosigned By    Initials Name Provider Type    VIKAS Megan King PTA Physical Therapy Assistant          Therapy Education  Given: HEP, Symptoms/condition management, Posture/body mechanics  Program: Progressed  How Provided: Verbal, Demonstration, Written  Provided to: Patient  Level of Understanding: Teach back education performed              Time Calculation:   Start Time: 0715  Stop Time: 0800  Time Calculation (min): 45 min  Therapy Suggested Charges     Code   Minutes Charges    None           Therapy Charges for Today     Code Description Service Date Service Provider Modifiers Qty    71130217068 HC PT THER PROC EA 15 MIN 6/19/2018 Megan King PTA GP 2    15589768646 HC PT MANUAL THERAPY EA 15 MIN 6/19/2018 Megan King PTA GP 1                    Megan King PTA  6/19/2018

## 2018-06-22 NOTE — TELEPHONE ENCOUNTER
I think this is the reminder that you wanted me to send you so you would know to call the pt personally.  The MRI is scheduled for 6/25/2018.

## 2018-06-25 ENCOUNTER — HOSPITAL ENCOUNTER (OUTPATIENT)
Dept: MRI IMAGING | Facility: HOSPITAL | Age: 68
Discharge: HOME | End: 2018-06-25
Attending: ORTHOPAEDIC SURGERY | Admitting: ORTHOPAEDIC SURGERY

## 2018-06-25 DIAGNOSIS — M47.22 CERVICAL SPONDYLOSIS WITH RADICULOPATHY: ICD-10-CM

## 2018-06-25 PROCEDURE — 72141 MRI NECK SPINE W/O DYE: CPT

## 2018-06-26 DIAGNOSIS — M47.22 CERVICAL SPONDYLOSIS WITH RADICULOPATHY: Primary | ICD-10-CM

## 2018-07-03 ENCOUNTER — HOSPITAL ENCOUNTER (OUTPATIENT)
Dept: PHYSICAL THERAPY | Facility: HOSPITAL | Age: 68
Setting detail: THERAPIES SERIES
Discharge: HOME | End: 2018-07-03
Attending: ORTHOPAEDIC SURGERY

## 2018-07-03 DIAGNOSIS — G89.29 CHRONIC LEFT SHOULDER PAIN: Primary | ICD-10-CM

## 2018-07-03 DIAGNOSIS — M25.512 CHRONIC LEFT SHOULDER PAIN: Primary | ICD-10-CM

## 2018-07-03 PROCEDURE — 97110 THERAPEUTIC EXERCISES: CPT

## 2018-07-03 NOTE — THERAPY DISCHARGE NOTE
Outpatient Physical Therapy Ortho Treatment Note/Discharge Summary  [facility]     Patient Name: Sandro Galindo  : 1950  MRN: [id number]  Today's Date: 7/3/2018      Visit Date: 2018    Visit Dx:    ICD-10-CM ICD-9-CM   1. Chronic left shoulder pain M25.512 719.41    G89.29 338.29       There is no problem list on file for this patient.       Past Medical History:   Diagnosis Date   • Arthritis    • Vitamin D deficiency         Past Surgical History:   Procedure Laterality Date   • COLONOSCOPY     • COLONOSCOPY N/A 2018    Procedure: COLONOSCOPY TO CECUM AND TERMINAL ILEUM;  Surgeon: Frandy Flowers MD;  Location: [facility] ENDOSCOPY;  Service: Gastroenterology   • CYST REMOVAL               PT Ortho     Row Name 18 0900       Subjective Comments    Subjective Comments Pt reports had cervical MRI.  RTMD Dr. edwards and referred for consult with Dr. Mittal.  When asked states has some neck pain and stiffness, infrequent but some soreness into left arm after long periods of time at computer.  -SI       Subjective Pain    Able to rate subjective pain? yes  -SI    Pre-Treatment Pain Level 0  -SI    Post-Treatment Pain Level 0  -SI    Subjective Pain Comment no pain or radicular sx at time  of tx  -SI       Posture/Observations    Alignment Options Thoracic kyphosis;Rounded shoulders;Forward head  -SI       Shoulder Impingement/Rotator Cuff Special Tests    Empty Can Test (RC Lesion) Left:;Negative  -SI       General ROM    GENERAL ROM COMMENTS Shoulder ROM equal left and right.  C flexion full, ext 50% with left radicular mild brief sx, Rotation left and right 75%, SB right 69%, SB left 60%  -SI       Left Shoulder (Manual Muscle Testing)    MMT: External Rotation, Left Shoulder external rotation  -SI    MMT, Gross Movement: Left Shoulder External Rotation (4/5) good  -SI    Comment, MMT: Left Shoulder Strength appeared WNL's with MMT today, with ER slightly less on left than  right  -SI      User Key  (r) = Recorded By, (t) = Taken By, (c) = Cosigned By    Initials Name Provider Type    VIKAS King DAMIEN Physical Therapy Assistant                            PT Assessment/Plan     Row Name 07/03/18 0957          PT Assessment    Assessment Comments Tx with focus on posture and body mechanics.  His sx are after prolonged time at computer and have instructed him on posture and reviewed.  He is ind with basic ex given.  Neck MRI report noted and further evaluation pending.  -SI       User Key  (r) = Recorded By, (t) = Taken By, (c) = Cosigned By    Initials Name Provider Type    VIKAS King DAMIEN Physical Therapy Assistant                    Exercises     Row Name 07/03/18 1000 07/03/18 0900          Subjective Comments    Subjective Comments  -- Pt reports had cervical MRI.  RTMD Dr. edwards and referred for consult with Dr. Mittal.  When asked states has some neck pain and stiffness, infrequent but some soreness into left arm after long periods of time at computer.  -SI        Subjective Pain    Able to rate subjective pain?  -- yes  -SI     Pre-Treatment Pain Level  -- 0  -SI     Post-Treatment Pain Level  -- 0  -SI     Subjective Pain Comment  -- no pain or radicular sx at time  of tx  -SI        Total Minutes    68698 - PT Therapeutic Exercise Minutes 45  -SI  --        Exercise 1    Exercise Name 1  -- rhomboid stretch on doorframe  -SI     Sets 1  -- 1  -SI     Reps 1  -- 6  -SI     Time 1  -- 10  -SI        Exercise 2    Exercise Name 2  -- pec stretch on dooframe  -SI     Sets 2  -- 1  -SI     Reps 2  -- 6  -SI     Time 2  -- 10  -SI        Exercise 3    Exercise Name 3  -- scap ret and she ext with blue TB  -SI     Sets 3  -- 2  -SI     Reps 3  -- 10  -SI        Exercise 5    Exercise Name 5  -- cat and cow  -SI     Sets 5  -- 2  -SI     Reps 5  -- 10  -SI     Additional Comments  -- --------------  -SI        Exercise 6    Exercise Name 6  -- supine ER with cane  -SI      Sets 6  -- 1  -SI     Reps 6  -- 10  -SI     Time 6  -- 10  -SI     Additional Comments  -- --------------------  -SI        Exercise 7    Exercise Name 7  -- ER side to neutral  -SI     Sets 7  -- 2  -SI     Reps 7  -- 10  -SI     Additional Comments  -- 2lbs  -SI        Exercise 8    Exercise Name 8  -- Scap ret and Sh ext with green TB  -SI     Sets 8  -- 2  -SI     Reps 8  -- 10  -SI        Exercise 9    Exercise Name 9  -- IR and ER with green TB  -SI     Sets 9  -- 2  -SI     Reps 9  -- 10  -SI        Exercise 10    Exercise Name 10  -- Chin tuck supine on 1.5 pillows  -SI     Cueing 10  -- Verbal;Demo  -SI     Sets 10  -- 1  -SI     Reps 10  -- 10  -SI        Exercise 11    Exercise Name 11  -- levator stretch  -SI     Sets 11  -- 1  -SI     Reps 11  -- 3  -SI     Time 11  -- 20  -SI        Exercise 12    Exercise Name 12  -- SB stretch left side  -SI     Cueing 12  -- Verbal;Demo  -SI     Reps 12  -- 3  -SI     Time 12  -- 20  -SI       User Key  (r) = Recorded By, (t) = Taken By, (c) = Cosigned By    Initials Name Provider Type    VIKAS King PTA Physical Therapy Assistant                               PT OP Goals     Row Name 07/03/18 0900          PT Short Term Goals    STG 1 Pt will be independent with HEP to improve posture and improve cervical and thoracic mobility with work activities as well as strengthen and protect shoulder to alleviate pain.  -SI     STG 1 Progress Met  -SI     STG 2 Pt will report no referred pain into LUE passing L elbow.  -SI     STG 2 Progress Met   occassion and can rid sx with change of posture  -SI       User Key  (r) = Recorded By, (t) = Taken By, (c) = Cosigned By    Initials Name Provider Type    VIKAS King PTA Physical Therapy Assistant          Therapy Education  Given: HEP, Symptoms/condition management, Posture/body mechanics  Program: Reinforced  How Provided: Verbal, Demonstration, Written  Provided to: Patient  Level of Understanding: Teach back  education performed              Time Calculation:   Start Time: 0715  Stop Time: 0800  Time Calculation (min): 45 min  Therapy Suggested Charges     Code   Minutes Charges    12537 (CPT®) Hc Pt Neuromusc Re Education Ea 15 Min      80926 (CPT®) Hc Pt Ther Proc Ea 15 Min 45 3    74895 (CPT®) Hc Gait Training Ea 15 Min      06730 (CPT®) Hc Pt Therapeutic Act Ea 15 Min      06809 (CPT®) Hc Pt Manual Therapy Ea 15 Min      20913 (CPT®) Hc Pt Ther Massage- Per 15 Min      03942 (CPT®) Hc Pt Iontophoresis Ea 15 Min      77788 (CPT®) Hc Pt Elec Stim Ea-Per 15 Min      38732 (CPT®) Hc Pt Ultrasound Ea 15 Min      55647 (CPT®) Hc Pt Self Care/Mgmt/Train Ea 15 Min      Total  45 3        Therapy Charges for Today     Code Description Service Date Service Provider Modifiers Qty    57007138261 HC PT THER PROC EA 15 MIN 7/3/2018 Megan King, PTA GP 3                OP PT Discharge Summary  Date of Discharge: 07/03/18  Reason for Discharge: All goals achieved  Discharge Destination: Home with home program  Discharge Instructions/Additional Comments: Improved posture with work and at computer.  Sh ex 3 times a week, neck ex daily      eMgan King, DAMIEN  7/3/2018

## 2018-07-05 NOTE — PROGRESS NOTES
Subjective   Patient ID: Sandro Galindo is a 68 y.o. male is being seen for consultation today at the request of David Lambert MD for neck stiffness and pain radiates into her left upper extremity. He has had approximately 4-5 weeks of physical therapy.    History of Present Illness    This patient has been having pain in his neck with radiation into his left arm for almost 2 years.  The problem originally began without a particular incident.  The pain is located in the left shoulder region and radiates down the left arm.  He does not have a lot of pain in his neck itself.  He has no trouble with the right arm and the pain that he has is sometimes sharp and stabbing and other times dull and aching.  It starts and stops kind of on its own.  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  He has no difficulty with bowel or bladder control or other associated symptoms.  The problem originally began when he was on a golf trip in the fall 2 years ago and got kind of cold.  Subsequent to that he began having some symptoms in his left hand.  He has been treated with physical therapy which has not helped.    The following portions of the patient's history were reviewed and updated as appropriate: allergies, current medications, past family history, past medical history, past social history, past surgical history and problem list.    Review of Systems   Respiratory: Negative for chest tightness and shortness of breath.    Cardiovascular: Negative for chest pain.   Musculoskeletal: Positive for myalgias, neck pain and neck stiffness.   Neurological: Negative for weakness and numbness.   All other systems reviewed and are negative.      Objective   Physical Exam   Constitutional: He is oriented to person, place, and time. He appears well-developed and well-nourished.   HENT:   Head: Normocephalic and atraumatic.   Eyes: Conjunctivae and EOM are normal. Pupils are equal, round, and  reactive to light.   Fundoscopic exam:       The right eye shows no papilledema. The right eye shows venous pulsations.        The left eye shows no papilledema. The left eye shows venous pulsations.   Neck: Carotid bruit is not present.   Neurological: He is oriented to person, place, and time. He has a normal Finger-Nose-Finger Test and a normal Heel to Shin Test. Gait normal.   Reflex Scores:       Tricep reflexes are 2+ on the right side and 2+ on the left side.       Bicep reflexes are 2+ on the right side and 2+ on the left side.       Brachioradialis reflexes are 2+ on the right side and 2+ on the left side.       Patellar reflexes are 2+ on the right side and 2+ on the left side.       Achilles reflexes are 2+ on the right side and 2+ on the left side.  Psychiatric: His speech is normal.     Neurologic Exam     Mental Status   Oriented to person, place, and time.   Registration of memory: Good recent and remote memory.   Attention: normal. Concentration: normal.   Speech: speech is normal   Level of consciousness: alert  Knowledge: consistent with education.     Cranial Nerves     CN II   Visual fields full to confrontation.   Visual acuity: normal    CN III, IV, VI   Pupils are equal, round, and reactive to light.  Extraocular motions are normal.     CN V   Facial sensation intact.   Right corneal reflex: normal  Left corneal reflex: normal    CN VII   Facial expression full, symmetric.   Right facial weakness: none  Left facial weakness: none    CN VIII   Hearing: intact    CN IX, X   Palate: symmetric    CN XI   Right sternocleidomastoid strength: normal  Left sternocleidomastoid strength: normal    CN XII   Tongue: not atrophic  Tongue deviation: none    Motor Exam   Muscle bulk: normal  Right arm tone: normal  Left arm tone: normal  Right leg tone: normal  Left leg tone: normal    Strength   Strength 5/5 except as noted.     Sensory Exam   Light touch normal.     Gait, Coordination, and Reflexes      Gait  Gait: normal    Coordination   Finger to nose coordination: normal  Heel to shin coordination: normal    Reflexes   Right brachioradialis: 2+  Left brachioradialis: 2+  Right biceps: 2+  Left biceps: 2+  Right triceps: 2+  Left triceps: 2+  Right patellar: 2+  Left patellar: 2+  Right achilles: 2+  Left achilles: 2+  Right : 2+  Left : 2+      Assessment/Plan   Independent Review of Radiographic Studies:      Reviewed an MRI of the cervical spine done on 25 June.  This shows a widely patent canal with just a little left foraminal stenosis at C2 3.  C3 4 shows some central stenosis with a little flattening of the anterior cord but no severe cord pressure.  C4 5 shows a left-sided disc bulge that may be putting some pressure on the nerve but not on the spinal cord.  C5 6 shows some left-sided foraminal stenosis as well.  C6 7 also shows some disc bulging worse on the left than the right and C7-T1 looks okay.    Medical Decision Making:      I told the patient about the imaging.  I recommended that we proceed with a cervical myelogram.  He asked a number of questions and would like to think about whether he wants to do the myelogram.  I told the patient what a myelogram involves.  I explained that there is a 50% chance of developing a bad headache and nausea as a result of the test.  I explained that there is also a very small chance of infection, seizures, and bleeding.  I explained how we would treat a post myelogram headache including bedrest, caffeinated fluids, steroids, and blood patch.  The patient does ask to think about it and will call if he wishes to proceed.    Sandro was seen today for neck pain.    Diagnoses and all orders for this visit:    Degeneration of intervertebral disc at C5-C6 level      Return for After radiology test.

## 2018-07-06 ENCOUNTER — OFFICE VISIT (OUTPATIENT)
Dept: NEUROSURGERY | Facility: CLINIC | Age: 68
End: 2018-07-06

## 2018-07-06 VITALS
SYSTOLIC BLOOD PRESSURE: 132 MMHG | BODY MASS INDEX: 23.48 KG/M2 | WEIGHT: 164 LBS | HEART RATE: 91 BPM | HEIGHT: 70 IN | DIASTOLIC BLOOD PRESSURE: 75 MMHG

## 2018-07-06 DIAGNOSIS — M50.322 DEGENERATION OF INTERVERTEBRAL DISC AT C5-C6 LEVEL: Primary | ICD-10-CM

## 2018-07-06 PROCEDURE — 99204 OFFICE O/P NEW MOD 45 MIN: CPT | Performed by: NEUROLOGICAL SURGERY

## 2018-07-13 ENCOUNTER — OFFICE VISIT (OUTPATIENT)
Dept: ORTHOPEDIC SURGERY | Facility: CLINIC | Age: 68
End: 2018-07-13

## 2018-07-13 VITALS — TEMPERATURE: 98.6 F | WEIGHT: 165 LBS | BODY MASS INDEX: 23.62 KG/M2 | HEIGHT: 70 IN

## 2018-07-13 DIAGNOSIS — M47.22 CERVICAL SPONDYLOSIS WITH RADICULOPATHY: Primary | ICD-10-CM

## 2018-07-13 PROCEDURE — 99213 OFFICE O/P EST LOW 20 MIN: CPT | Performed by: ORTHOPAEDIC SURGERY

## 2019-01-15 ENCOUNTER — OFFICE VISIT (OUTPATIENT)
Dept: ORTHOPEDIC SURGERY | Facility: CLINIC | Age: 69
End: 2019-01-15

## 2019-01-15 VITALS — BODY MASS INDEX: 23.62 KG/M2 | TEMPERATURE: 98.1 F | WEIGHT: 165 LBS | HEIGHT: 70 IN

## 2019-01-15 DIAGNOSIS — M43.02 SPONDYLOLYSIS OF CERVICAL REGION: Primary | ICD-10-CM

## 2019-01-15 PROCEDURE — 99213 OFFICE O/P EST LOW 20 MIN: CPT | Performed by: ORTHOPAEDIC SURGERY

## 2019-01-15 NOTE — PROGRESS NOTES
No new complaints occasional radiating pain and some on the right less on the left presently on exam good strength and sensation.  Munoz test negative.  He'll be cutting back to 2 days a week of practice now on that should the help his symptoms somewhat.  I reviewed the MRI canal stenosis at 3 for and then foraminal stenotic changes at C5-6 and C6-7.  The the worrisome area is 3 for and the symptomatic area is the lower.  For now he will hold off of surgery and he understands her some slight risk of not operating but that the if we were to do a 4 level intervention that would address all concerns the likelihood of returning to dental practice would be extremely low.  See him back in 6 months or so for further evaluation.

## 2019-02-25 ENCOUNTER — AMBULATORY SURGICAL CENTER (OUTPATIENT)
Dept: URBAN - METROPOLITAN AREA SURGERY 20 | Facility: SURGERY | Age: 69
End: 2019-02-25

## 2019-02-25 ENCOUNTER — OFFICE (OUTPATIENT)
Dept: URBAN - METROPOLITAN AREA PATHOLOGY 4 | Facility: PATHOLOGY | Age: 69
End: 2019-02-25

## 2019-02-25 DIAGNOSIS — K31.89 OTHER DISEASES OF STOMACH AND DUODENUM: ICD-10-CM

## 2019-02-25 DIAGNOSIS — R13.10 DYSPHAGIA, UNSPECIFIED: ICD-10-CM

## 2019-02-25 DIAGNOSIS — K29.50 UNSPECIFIED CHRONIC GASTRITIS WITHOUT BLEEDING: ICD-10-CM

## 2019-02-25 DIAGNOSIS — K29.70 GASTRITIS, UNSPECIFIED, WITHOUT BLEEDING: ICD-10-CM

## 2019-02-25 DIAGNOSIS — K22.2 ESOPHAGEAL OBSTRUCTION: ICD-10-CM

## 2019-02-25 LAB
GI HISTOLOGY: A. SELECT: (no result)
GI HISTOLOGY: PDF REPORT: (no result)

## 2019-02-25 PROCEDURE — 43450 DILATE ESOPHAGUS 1/MULT PASS: CPT | Performed by: INTERNAL MEDICINE

## 2019-02-25 PROCEDURE — 43239 EGD BIOPSY SINGLE/MULTIPLE: CPT | Performed by: INTERNAL MEDICINE

## 2019-02-25 PROCEDURE — 88305 TISSUE EXAM BY PATHOLOGIST: CPT | Performed by: INTERNAL MEDICINE

## 2019-02-25 NOTE — SERVICEHPINOTES
intermittent dysphagia for food, liquids ok,  sometimes material goes into trachea.  otherwise doing ok currently   patient mother with breast cancer in 40s ,  none of his six siblings have any cancer, he defers on formal genetic testing

## 2019-07-16 ENCOUNTER — OFFICE VISIT (OUTPATIENT)
Dept: ORTHOPEDIC SURGERY | Facility: CLINIC | Age: 69
End: 2019-07-16

## 2019-07-16 VITALS — WEIGHT: 166 LBS | BODY MASS INDEX: 23.77 KG/M2 | HEIGHT: 70 IN | TEMPERATURE: 98.6 F

## 2019-07-16 DIAGNOSIS — M47.22 CERVICAL SPONDYLOSIS WITH RADICULOPATHY: Primary | ICD-10-CM

## 2019-07-16 PROCEDURE — 99213 OFFICE O/P EST LOW 20 MIN: CPT | Performed by: ORTHOPAEDIC SURGERY

## 2019-07-16 NOTE — PROGRESS NOTES
He is doing okay.  He is retiring from dental practice tomorrow.  Has some mild neck discomfort and left arm pain but in general is better than before.  On exam subdued reflexes but good strength and negative Silvano test.  No lower extremity hyperreflexia.  I reviewed his MRI and he does have C3-4 canal stenosis but it is moderate at worst.  Has some left foraminal narrowing mostly at C5-6.  For now I think we can leave him alone and avoid extreme trauma and keep a six-month follow-up and will see how he is doing and to detention.  No x-rays are needed on return visit.

## 2019-12-16 ENCOUNTER — TRANSCRIBE ORDERS (OUTPATIENT)
Dept: ADMINISTRATIVE | Facility: HOSPITAL | Age: 69
End: 2019-12-16

## 2019-12-16 DIAGNOSIS — M79.605 PAIN AND SWELLING OF LEFT LOWER EXTREMITY: Primary | ICD-10-CM

## 2019-12-16 DIAGNOSIS — M79.89 PAIN AND SWELLING OF LEFT LOWER EXTREMITY: Primary | ICD-10-CM

## 2020-02-18 ENCOUNTER — OFFICE VISIT (OUTPATIENT)
Dept: ORTHOPEDIC SURGERY | Facility: CLINIC | Age: 70
End: 2020-02-18

## 2020-02-18 VITALS — TEMPERATURE: 97.8 F | HEIGHT: 70 IN | BODY MASS INDEX: 23.62 KG/M2 | WEIGHT: 165 LBS

## 2020-02-18 DIAGNOSIS — M47.22 CERVICAL SPONDYLOSIS WITH RADICULOPATHY: Primary | ICD-10-CM

## 2020-02-18 PROCEDURE — 99213 OFFICE O/P EST LOW 20 MIN: CPT | Performed by: ORTHOPAEDIC SURGERY

## 2020-02-18 NOTE — PROGRESS NOTES
He is doing very well in assisted mild neck pain and only occasional arm symptoms and in general no new imbalance although he notes he thinks about it more than before.  On exam absolutely no long track signs and good strength in the upper extremities bilaterally reflexes are intact.  I think he looks great I reviewed the MRI and the extent of stenosis is moderate but again not worthy of prophylactic intervention I believe.  He understands the minimal risk involved in leaving this alone and that the surgical risk may be higher at this point and I have offered to see him back as needed and will go ahead make an appointment for follow-up in a year.  No x-rays needed.

## 2020-07-15 ENCOUNTER — TRANSCRIBE ORDERS (OUTPATIENT)
Dept: ADMINISTRATIVE | Facility: HOSPITAL | Age: 70
End: 2020-07-15

## 2020-07-15 DIAGNOSIS — N28.1 RENAL CYST, RIGHT: Primary | ICD-10-CM

## 2020-09-04 ENCOUNTER — HOSPITAL ENCOUNTER (OUTPATIENT)
Dept: CT IMAGING | Facility: HOSPITAL | Age: 70
Discharge: HOME | End: 2020-09-04
Admitting: INTERNAL MEDICINE

## 2020-09-04 DIAGNOSIS — N28.1 RENAL CYST, RIGHT: ICD-10-CM

## 2020-09-04 LAB — CREAT BLDA-MCNC: 1.3 MG/DL (ref 0.6–1.3)

## 2020-09-04 PROCEDURE — 82565 ASSAY OF CREATININE: CPT

## 2020-09-04 PROCEDURE — 25010000002 IOPAMIDOL 61 % SOLUTION: Performed by: INTERNAL MEDICINE

## 2020-09-04 PROCEDURE — 74178 CT ABD&PLV WO CNTR FLWD CNTR: CPT

## 2020-09-04 RX ADMIN — IOPAMIDOL 85 ML: 612 INJECTION, SOLUTION INTRAVENOUS at 10:05

## 2021-02-03 ENCOUNTER — IMMUNIZATION (OUTPATIENT)
Dept: VACCINE CLINIC | Facility: HOSPITAL | Age: 71
End: 2021-02-03

## 2021-02-03 PROCEDURE — 0001A: CPT | Performed by: INTERNAL MEDICINE

## 2021-02-03 PROCEDURE — 91300 HC SARSCOV02 VAC 30MCG/0.3ML IM: CPT | Performed by: INTERNAL MEDICINE

## 2021-02-23 ENCOUNTER — OFFICE VISIT (OUTPATIENT)
Dept: ORTHOPEDIC SURGERY | Facility: CLINIC | Age: 71
End: 2021-02-23

## 2021-02-23 VITALS — BODY MASS INDEX: 23.62 KG/M2 | TEMPERATURE: 97 F | WEIGHT: 165 LBS | HEIGHT: 70 IN

## 2021-02-23 DIAGNOSIS — M47.22 CERVICAL SPONDYLOSIS WITH RADICULOPATHY: Primary | ICD-10-CM

## 2021-02-23 PROCEDURE — 99213 OFFICE O/P EST LOW 20 MIN: CPT | Performed by: ORTHOPAEDIC SURGERY

## 2021-02-23 NOTE — PROGRESS NOTES
I have been following him for cervical spondylosis with radiculopathy and some mild canal stenosis at C3-4.  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  His balance is good, no bowel or bladder complaints no numbness or tingling.  On exam he has subdued but symmetrical reflexes, good strength and sensation.  Romberg negative and balance appears good.  Overall he looks to be doing great.  I reviewed his MRI from 2018 and told him if he has any symptoms at all we will get a new one next year and I will see him back in 1 year.  He inquired about getting on the trampoline with his grandchild and I told him he could do some very gentle bouncing but that if he falls obviously he is going to be at somewhat increased risk, unlikely for paralysis but for nerve root injury it could be elevated.

## 2021-02-24 ENCOUNTER — IMMUNIZATION (OUTPATIENT)
Dept: VACCINE CLINIC | Facility: HOSPITAL | Age: 71
End: 2021-02-24

## 2021-02-24 PROCEDURE — 91300 HC SARSCOV02 VAC 30MCG/0.3ML IM: CPT | Performed by: INTERNAL MEDICINE

## 2021-02-24 PROCEDURE — 0002A: CPT | Performed by: INTERNAL MEDICINE

## 2021-12-30 ENCOUNTER — TRANSCRIBE ORDERS (OUTPATIENT)
Dept: PHYSICAL THERAPY | Facility: HOSPITAL | Age: 71
End: 2021-12-30

## 2021-12-30 DIAGNOSIS — M54.2 NECK PAIN: Primary | ICD-10-CM

## 2022-03-01 ENCOUNTER — OFFICE VISIT (OUTPATIENT)
Dept: ORTHOPEDIC SURGERY | Facility: CLINIC | Age: 72
End: 2022-03-01

## 2022-03-01 VITALS — BODY MASS INDEX: 23.48 KG/M2 | HEIGHT: 70 IN | WEIGHT: 164 LBS | TEMPERATURE: 97 F

## 2022-03-01 DIAGNOSIS — M47.22 CERVICAL SPONDYLOSIS WITH RADICULOPATHY: Primary | ICD-10-CM

## 2022-03-01 PROCEDURE — 99213 OFFICE O/P EST LOW 20 MIN: CPT | Performed by: ORTHOPAEDIC SURGERY

## 2022-03-01 PROCEDURE — 72040 X-RAY EXAM NECK SPINE 2-3 VW: CPT | Performed by: ORTHOPAEDIC SURGERY

## 2022-03-01 NOTE — PROGRESS NOTES
He had a flareup in early January, when I was out of the office, of neck pain but no radiation.  I have been following him for cervical spondylosis with some canal stenosis but no myelopathy.  A Medrol pack seem to help.  No numbness tingling weakness balance difficulties bowel or bladder complaints.  On exam he has intact motor and sensory function Silvano test is negative reflexes are subdued and symmetrical.  X-rays show a bit more degenerative change at C6-7 by the way of disc space narrowing than I saw in 2018 but no other changes.  Balance looks good.  Overall he is doing well and enjoying [facility].  I will see him back in about a year no x-rays are needed unless he has any new problems.

## 2022-09-27 ENCOUNTER — APPOINTMENT (OUTPATIENT)
Dept: VACCINE CLINIC | Facility: HOSPITAL | Age: 72
End: 2022-09-27

## 2022-10-19 ENCOUNTER — HOSPITAL ENCOUNTER (OUTPATIENT)
Dept: GENERAL RADIOLOGY | Facility: HOSPITAL | Age: 72
Discharge: HOME | End: 2022-10-19
Admitting: INTERNAL MEDICINE

## 2022-10-19 DIAGNOSIS — R05.2 SUBACUTE COUGH: ICD-10-CM

## 2022-10-19 PROCEDURE — 71046 X-RAY EXAM CHEST 2 VIEWS: CPT

## 2022-10-20 ENCOUNTER — TRANSCRIBE ORDERS (OUTPATIENT)
Dept: ADMINISTRATIVE | Facility: HOSPITAL | Age: 72
End: 2022-10-20

## 2022-10-20 DIAGNOSIS — R19.8 ABDOMINAL FULLNESS: Primary | ICD-10-CM

## 2022-10-26 ENCOUNTER — HOSPITAL ENCOUNTER (OUTPATIENT)
Dept: CT IMAGING | Facility: HOSPITAL | Age: 72
Discharge: HOME | End: 2022-10-26
Admitting: INTERNAL MEDICINE

## 2022-10-26 DIAGNOSIS — R19.8 ABDOMINAL FULLNESS: ICD-10-CM

## 2022-10-26 PROCEDURE — 0 IOPAMIDOL PER 1 ML: Performed by: INTERNAL MEDICINE

## 2022-10-26 PROCEDURE — 82565 ASSAY OF CREATININE: CPT

## 2022-10-26 PROCEDURE — 74174 CTA ABD&PLVS W/CONTRAST: CPT

## 2022-10-26 RX ADMIN — IOPAMIDOL 100 ML: 755 INJECTION, SOLUTION INTRAVENOUS at 08:18

## 2022-10-29 LAB — CREAT BLDA-MCNC: 1.1 MG/DL (ref 0.6–1.3)

## 2022-12-01 ENCOUNTER — TELEPHONE (OUTPATIENT)
Dept: ORTHOPEDIC SURGERY | Facility: CLINIC | Age: 72
End: 2022-12-01

## 2022-12-01 NOTE — TELEPHONE ENCOUNTER
Caller: RISA TRUJILLO    Relationship to patient: SELF    Best call back number: [phone]    Chief complaint: FOLLOW UP     Type of visit: FOLLOW UP     Requested date: NA    If rescheduling, when is the original appointment: NA    Additional notes: PATIENT WANTS TO KNOW WHO WILL SEE HIM FOR THIS 02/28/2022 APPOINTMENT

## 2023-01-09 ENCOUNTER — AMBULATORY SURGICAL CENTER (OUTPATIENT)
Dept: URBAN - METROPOLITAN AREA SURGERY 20 | Facility: SURGERY | Age: 73
End: 2023-01-09

## 2023-01-09 DIAGNOSIS — K22.89 OTHER SPECIFIED DISEASE OF ESOPHAGUS: ICD-10-CM

## 2023-01-09 DIAGNOSIS — K22.2 ESOPHAGEAL OBSTRUCTION: ICD-10-CM

## 2023-01-09 DIAGNOSIS — R13.10 DYSPHAGIA, UNSPECIFIED: ICD-10-CM

## 2023-01-09 PROBLEM — K22.4 DYSKINESIA OF ESOPHAGUS: Status: ACTIVE | Noted: 2023-01-09

## 2023-01-09 PROCEDURE — 43235 EGD DIAGNOSTIC BRUSH WASH: CPT | Performed by: INTERNAL MEDICINE

## 2023-01-09 PROCEDURE — 43450 DILATE ESOPHAGUS 1/MULT PASS: CPT | Mod: 59 | Performed by: INTERNAL MEDICINE

## 2023-02-27 ENCOUNTER — OFFICE VISIT (OUTPATIENT)
Dept: ORTHOPEDIC SURGERY | Facility: CLINIC | Age: 73
End: 2023-02-27
Payer: MEDICARE

## 2023-02-27 VITALS — TEMPERATURE: 97.1 F | HEIGHT: 70 IN | WEIGHT: 174 LBS | BODY MASS INDEX: 24.91 KG/M2

## 2023-02-27 DIAGNOSIS — M47.22 CERVICAL SPONDYLOSIS WITH RADICULOPATHY: Primary | ICD-10-CM

## 2023-02-27 DIAGNOSIS — R52 PAIN: ICD-10-CM

## 2023-02-27 PROCEDURE — 99213 OFFICE O/P EST LOW 20 MIN: CPT | Performed by: NURSE PRACTITIONER

## 2023-02-27 PROCEDURE — 72040 X-RAY EXAM NECK SPINE 2-3 VW: CPT | Performed by: NURSE PRACTITIONER

## 2023-02-27 RX ORDER — OMEPRAZOLE 20 MG/1
1 CAPSULE, DELAYED RELEASE ORAL DAILY
COMMUNITY
Start: 2023-02-20

## 2023-02-27 NOTE — PROGRESS NOTES
Patient Name: Sandro Galindo   YOB: 1950  Referring Primary Care Physician: Sheila Sandoval MD      Chief Complaint:    Chief Complaint   Patient presents with   • Cervical Spine - Initial Evaluation, Pain        HPI:  Sandro Galindo is a 73 y.o. male who presents to [facility] for follow-up of chronic neck pain.  He has occasional cramping in the right hand worse in the morning, but no true radicular symptoms consistently.  No bowel or bladder dysfunction, no imbalance or incoordination.  No new symptoms since last visit.  He has followed Dr. Lambert for same symptoms since 2018.  The plan has been to avoid surgery for as long as possible.  He did have a second opinion with Dr. Mittal who did recommend surgery based on degree of stenosis on cervical MRI.  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    PFSH:  See attached    ROS: As per HPI, otherwise negative    Objective:    Vitals:    02/27/23 1306   Temp: 97.1 °F (36.2 °C)     Body mass index is 24.97 kg/m².      Neurologic Exam     Mental Status   Oriented to person, place, and time.   Speech: speech is normal     Motor Exam   Muscle bulk: normal  Overall muscle tone: normal    Strength   Right deltoid: 5/5  Left deltoid: 5/5  Right biceps: 5/5  Left biceps: 5/5  Right triceps: 5/5  Left triceps: 5/5    Sensory Exam   Right arm light touch: normal  Left arm light touch: normal    Occasional cramping in left hand     Gait, Coordination, and Reflexes     Reflexes   Right brachioradialis: 0  Left brachioradialis: 0  Right biceps: 0  Left biceps: 0  Right triceps: 0  Left triceps: 0         IMAGING:     Indication: pain related symptoms,  Views: 2V AP&LAT cervical  Findings: Reviewed and reveals multilevel degenerative change with disc space narrowing most pronounced at C3-4,  C5-6 and C6-7, large anterior osteophytes at C4 -5  Comparison views: No significant change from 3/1/2022    Assessment:           Diagnoses and all orders for this visit:    1. Cervical spondylosis with radiculopathy (Primary)    2. Pain  -     XR Spine Cervical 2 View             Plan:  Symptoms have been stable since last visit.  He has no loss of nerve function or myelopathic findings on exam.  We discussed updating cervical MRI, but will hold off for when/if he has any flareup or develops persistent radicular symptoms.  We have discussed red flags to include imbalance, weakness or persistent radicular pain.  If any of these develop, he will call and we will update cervical MRI.  We will follow-up in 1 year or as needed.    Return in about 1 year (around 2/27/2024).

## 2023-02-27 NOTE — PROGRESS NOTES
Patient Name: Sandro Galindo   YOB: 1950  Referring Primary Care Physician: Sheila Sandoval MD      Chief Complaint:    Chief Complaint   Patient presents with   • Cervical Spine - Initial Evaluation, Pain        HPI:  Sandro Galindo is a 73 y.o. male who presents to [facility] for follow-up of chronic neck pain.  He has occasional cramping in the right hand worse in the morning, but no true radicular symptoms consistently.  No bowel or bladder dysfunction, no imbalance or incoordination.  No new symptoms since last visit.  He has followed Dr. Lambert for same symptoms since 2018.  The plan has been to avoid surgery for as long as possible.  He did have a second opinion with Dr. Mittal who did recommend surgery based on degree of stenosis on cervical MRI.  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    PFSH:  See attached    ROS: As per HPI, otherwise negative    Objective:    Vitals:    02/27/23 1306   Temp: 97.1 °F (36.2 °C)     Body mass index is 24.97 kg/m².      Neurologic Exam     Mental Status   Oriented to person, place, and time.   Speech: speech is normal     Motor Exam   Muscle bulk: normal  Overall muscle tone: normal    Strength   Right deltoid: 5/5  Left deltoid: 5/5  Right biceps: 5/5  Left biceps: 5/5  Right triceps: 5/5  Left triceps: 5/5    Sensory Exam   Right arm light touch: normal  Left arm light touch: normal    Occasional cramping in left hand     Gait, Coordination, and Reflexes     Reflexes   Right brachioradialis: 0  Left brachioradialis: 0  Right biceps: 0  Left biceps: 0  Right triceps: 0  Left triceps: 0         IMAGING:     Indication: pain related symptoms,  Views: 2V AP&LAT cervical  Findings: Reviewed and reveals multilevel degenerative change with disc space narrowing most pronounced at C3-4,  C5-6 and C6-7, large anterior osteophytes at C4 -5  Comparison views: No significant change from 3/1/2022    Assessment:           Diagnoses and all orders for this visit:    1. Cervical spondylosis with radiculopathy (Primary)    2. Pain  -     XR Spine Cervical 2 View             Plan:  Symptoms have been stable since last visit.  He has no loss of nerve function or myelopathic findings on exam.  We discussed updating cervical MRI, but will hold off for when/if he has any flareup or develops persistent radicular symptoms.  We have discussed red flags to include imbalance, weakness or persistent radicular pain.  If any of these develop, he will call and we will update cervical MRI.  We will follow-up in 1 year or as needed.    Return in about 1 year (around 2/27/2024).

## 2024-02-27 ENCOUNTER — OFFICE VISIT (OUTPATIENT)
Dept: ORTHOPEDIC SURGERY | Facility: CLINIC | Age: 74
End: 2024-02-27
Payer: MEDICARE

## 2024-02-27 VITALS — TEMPERATURE: 97.3 F | HEIGHT: 70 IN | BODY MASS INDEX: 23.48 KG/M2 | WEIGHT: 164 LBS

## 2024-02-27 DIAGNOSIS — M54.2 NECK PAIN: ICD-10-CM

## 2024-02-27 DIAGNOSIS — M50.30 DDD (DEGENERATIVE DISC DISEASE), CERVICAL: Primary | ICD-10-CM

## 2024-02-27 PROCEDURE — 99213 OFFICE O/P EST LOW 20 MIN: CPT | Performed by: NURSE PRACTITIONER

## 2024-02-27 RX ORDER — TADALAFIL 5 MG/1
TABLET ORAL
COMMUNITY
Start: 2024-02-26

## 2024-02-27 NOTE — PROGRESS NOTES
Patient Name: Sandro Galindo   YOB: 1950  Referring Primary Care Physician: Sheila Sandoval MD      Chief Complaint:    Chief Complaint   Patient presents with    Cervical Spine - Follow-up, Pain            HPI:  Sandro Galindo is a 74 y.o. male who presents to [facility] for follow-up of chronic neck pain.  No new symptoms since last visit.  He has been active since last year.  He is golfing and tolerating that well.  No radicular complaints today.    PFSH:  See attached    ROS: As per HPI, otherwise negative    Objective:      74 y.o. male  Body mass index is 23.53 kg/m²., 74.4 kg (164 lb), @HT@  Vitals:    02/27/24 0812   Temp: 97.3 °F (36.3 °C)     Pain Score    02/27/24 0812   PainSc: 0-No pain   PainLoc: Neck            Spine Musculoskeletal Exam    Palpation    Cervical Spine    Tenderness: present    Right      Muscle tone: normal    Left      Muscle tone: normal    Range of Motion    Cervical Spine       Cervical flexion: normal.     Cervical extension: normal.       Right      Lateral bending: reduced bend (0-25%).       Lateral rotation: normal.       Left      Lateral bending: reduced bend (0-25%).       Lateral rotation: normal.      Strength    Cervical Spine    Cervical spine motor exam is normal.    Sensory    Cervical Spine    Cervical spine sensation is normal.    Reflexes    Right        Biceps: hyporeflexic      Brachioradialis: hyporeflexic      Triceps: hyporeflexic      Munoz: absent    Left        Biceps: hyporeflexic        Brachioradialis: hyporeflexic      Triceps: hyporeflexic      Munoz: absent    General      Constitutional: well-developed and well-nourished    Scleral icterus: no    Labored breathing: no    Psychiatric: normal mood and affect and no acute distress    Neurological: alert and oriented x3    Skin: intact        IMAGING:     Indication: pain related symptoms,  Views: 2V AP&LAT cervical  Findings: Reviewed and reveals  multilevel degenerative disc changes primarily C3-4, C5-6 and C6-7, anterior osteophyte formation most pronounced at C4-5, multilevel facet arthropathy,  Comparison views: No significant change from 2/27/2023    Assessment:           Diagnoses and all orders for this visit:    1. DDD (degenerative disc disease), cervical (Primary)    2. Neck pain  -     XR Spine Cervical 2 or 3 View             Plan:  Neck pain has been stable over the past year.  He has significant degenerative change of the cervical spine but no radiculopathy and no loss of nerve function on exam.  He is remaining active and encouraged to do so long-term.  He would like to follow-up in 1 year to keep a close eye on any changes.  We have discussed red flags.  We have also discussed for Forestier syndrome and how the osteophyte at C4-5 could in theory affect his swallowing.  He has had what sounds like esophageal dilatation in the past but does report improvement with swallowing afterwards.  He is aware that if the swallowing worsens or does not improve after dilatation, could be related to the osteophyte.  Call in the meantime with questions or concerns.      Return in about 1 year (around 2/27/2025).    EMR Dragon/Transcription Disclaimer:   Much of this encounter note is an electronic transcription/translation of spoken language to printed text. The electronic translation of spoken language may permit erroneous, or at times, nonsensical words or phrases to be inadvertently transcribed; Although I have reviewed the note for such errors, some may still exist.  Red flags have been discussed at this or previous visits to include but not limited weakness in extremities, worsening pain that does not respond to conservative treatment and bowel or bladder dysfunction. These are reasons to present to ER and patient has been informed.    The diagnosis(es), natural history, pathophysiology and treatment for diagnosis(es) were discussed. Opportunity given  and questions answered. Biomechanics of pertinent body areas discussed.    EXERCISES:  Advice on benefits of, and types of regular/moderate exercise pertaining to diagnosis.  Continue HEP. For back or neck pain, recommend pilates and or yoga as tolerated. Generally it is best to start any new exercise under the guidance of a  or therapist.   MEDICATIONS:  When prescribe, the risks, benefits, warnings,side effects and alternatives of medications discussed. Advised against long term use of narcotics.   PAIN CONTROL:  Cold, heat, OTC lidocaine patches and/or ointment as needed. Avoid direct skin contact with ice. Ice 15-20 minutes 3-4 times daily as needed. For SI joint pain, recommend ice bath in water about 50 degrees for 5 consecutive days, add ice slowly to help with adjustment and may cover with warm towel or robe to help with cold tolerance. If using lidocaine, do not apply heat in conjunction as this can cause a burn.   MEDICAL RECORDS reviewed from other provider(s) for past and current medical history pertinent to this visit..

## 2024-03-11 DIAGNOSIS — I77.4: ICD-10-CM

## 2024-03-11 DIAGNOSIS — I77.1 STRICTURE OF ARTERY: Primary | ICD-10-CM

## 2024-06-21 PROBLEM — I77.1 STENOSIS OF ILIAC ARTERY: Status: ACTIVE | Noted: 2024-06-21

## 2024-06-21 NOTE — PROGRESS NOTES
Chief Complaint  No chief complaint on file.    Subjective        Sandro Galindo presents to [facility]  History of Present Illness  Sandro Galindo is a 74-year-old male followed for celiac artery stenosis. He had a CT angiogram performed on October 26, 2022 due to periodic episodes of a tightness feeling in his lower chest and upper abdomen. There was no consistency to this. The CT demonstrated moderate to severe stenosis in the celiac trunk. The SMA, left renal artery, and SARAVANAN were totally normal. Other than the tortuosity, the right renal artery was normal. I felt there was angulation of the celiac trunk but there did not appear to be significant stenosis, only about 50%. There was no aneurysm involving the right renal artery just tortuosity of the branches distally. He returned on September 27, 2023 with a mesenteric artery duplex scan. This demonstrated greater than 70% stenosis in both the celiac trunk and SMA, and it was felt that the increased velocities  were from angulation. He remains totally asymptomatic.  He returned on June 26, 2024 with a mesenteric artery duplex scan.  Again, increased velocities in the celiac trunk and SMA, greater than 70% stenosis by velocity criteria.  SARAVANAN patent.      Past History:  Medical History: has a past medical history of Arthritis, Arthritis of neck (2016), Degeneration of intervertebral disc at C5-C6 level (07/06/2018), Osgood-Schlatter's disease (1960), and Vitamin D deficiency.   Surgical History: has a past surgical history that includes Colonoscopy; Cyst Removal; and Colonoscopy (N/A, 05/25/2018).   Family History: family history includes Cancer in his brother, father, and mother; Diabetes in his mother; No Known Problems in his brother, brother, brother, brother, brother, and brother.   Social History: reports that he has never smoked. He has never been exposed to tobacco smoke. He has never used smokeless tobacco. He reports that  "he does not currently use alcohol. He reports that he does not use drugs.    (Not in a hospital admission)     Allergies: Sulfa antibiotics   Objective   Vital Signs:  There were no vitals taken for this visit.  Estimated body mass index is 23.53 kg/m² as calculated from the following:    Height as of 2/27/24: 177.8 cm (70\").    Weight as of 2/27/24: 74.4 kg (164 lb).     BMI is within normal parameters. No other follow-up for BMI required.    Sandro Galindo  reports that he has never smoked. He has never been exposed to tobacco smoke. He has never used smokeless tobacco.          Physical Exam   Result Review :        Data reviewed : Mesenteric artery scan from September 27, 2023 and June 26, 2024, as described above             Assessment and Plan     Diagnoses and all orders for this visit:    1. Chronic vascular disorder of intestine (Primary)    2. Celiac artery stenosis    On review of his CT scan from October 2020, the SMA and renal arteries were normal and there was tortuosity with angulation of the celiac trunk but no real stenosis.  I believe the increased velocities noted on mesenteric artery duplex scans are result of the tortuosity and not true stenosis, particularly since he remains totally asymptomatic and the velocities are unchanged from his previous study.  This was explained to him in detail and he is in agreement with the plan.  He understands that if he were to develop any symptoms, which were explained to him, that he would notify us and we will proceed with CT angiogram.  Otherwise, see him in follow-up in 1 year.         Follow Up     No follow-ups on file.  Patient was given instructions and counseling regarding his condition or for health maintenance advice. Please see specific information pulled into the AVS if appropriate.       "

## 2024-06-26 ENCOUNTER — OFFICE VISIT (OUTPATIENT)
Age: 74
End: 2024-06-26
Payer: MEDICARE

## 2024-06-26 ENCOUNTER — HOSPITAL ENCOUNTER (OUTPATIENT)
Facility: HOSPITAL | Age: 74
Discharge: HOME | End: 2024-06-26
Admitting: SURGERY
Payer: MEDICARE

## 2024-06-26 VITALS
SYSTOLIC BLOOD PRESSURE: 138 MMHG | BODY MASS INDEX: 23.48 KG/M2 | HEIGHT: 70 IN | DIASTOLIC BLOOD PRESSURE: 57 MMHG | HEART RATE: 53 BPM | WEIGHT: 164 LBS

## 2024-06-26 DIAGNOSIS — K55.1: Primary | ICD-10-CM

## 2024-06-26 DIAGNOSIS — I77.1 STRICTURE OF ARTERY: ICD-10-CM

## 2024-06-26 DIAGNOSIS — I77.1 CELIAC ARTERY STENOSIS: ICD-10-CM

## 2024-06-26 DIAGNOSIS — I77.4: ICD-10-CM

## 2024-06-26 LAB
BH CV VAS SMA AORTA DIAMETER: 2.43 CM
BH CV VAS SMA AORTA PSV: 65.8 CM/S
BH CV VAS SMA CELIAC ORIGIN EDV: 59.1 CM/S
BH CV VAS SMA CELIAC ORIGIN PSV: 528 CM/S
BH CV VAS SMA HEPATIC EDV: 17.3 CM/S
BH CV VAS SMA HEPATIC PSV: 83.9 CM/S
BH CV VAS SMA IMA EDV: 38.7 CM/S
BH CV VAS SMA IMA PSV: 385 CM/S
BH CV VAS SMA ORIGIN EDV: 39.7 CM/S
BH CV VAS SMA ORIGIN PSV: 280 CM/S
BH CV VAS SMA SMA DIST PSV: -148.2 CM/S
BH CV VAS SMA SMA MID PSV: -226.1 CM/S
BH CV VAS SMA SMA PROX EDV: 48.9 CM/S
BH CV VAS SMA SMA PROX PSV: 399.2 CM/S
BH CV VAS SMA SPLENIC EDV: 31 CM/S
BH CV VAS SMA SPLENIC PSV: 104 CM/S

## 2024-06-26 PROCEDURE — 93975 VASCULAR STUDY: CPT

## 2024-06-26 PROCEDURE — 93975 VASCULAR STUDY: CPT | Performed by: SURGERY

## 2024-06-26 PROCEDURE — 1159F MED LIST DOCD IN RCRD: CPT | Performed by: SURGERY

## 2024-06-26 PROCEDURE — G2211 COMPLEX E/M VISIT ADD ON: HCPCS | Performed by: SURGERY

## 2024-06-26 PROCEDURE — 1160F RVW MEDS BY RX/DR IN RCRD: CPT | Performed by: SURGERY

## 2024-06-26 PROCEDURE — 99213 OFFICE O/P EST LOW 20 MIN: CPT | Performed by: SURGERY

## 2025-01-16 DIAGNOSIS — I77.4 CELIAC ARTERY STENOSIS: ICD-10-CM

## 2025-01-16 DIAGNOSIS — K55.1: Primary | ICD-10-CM

## 2025-02-25 ENCOUNTER — OFFICE VISIT (OUTPATIENT)
Dept: ORTHOPEDIC SURGERY | Facility: CLINIC | Age: 75
End: 2025-02-25
Payer: MEDICARE

## 2025-02-25 VITALS — WEIGHT: 174.2 LBS | BODY MASS INDEX: 24.94 KG/M2 | TEMPERATURE: 97.3 F | HEIGHT: 70 IN

## 2025-02-25 DIAGNOSIS — M50.30 DDD (DEGENERATIVE DISC DISEASE), CERVICAL: Primary | ICD-10-CM

## 2025-02-25 PROCEDURE — 1160F RVW MEDS BY RX/DR IN RCRD: CPT | Performed by: NURSE PRACTITIONER

## 2025-02-25 PROCEDURE — 99213 OFFICE O/P EST LOW 20 MIN: CPT | Performed by: NURSE PRACTITIONER

## 2025-02-25 PROCEDURE — 1159F MED LIST DOCD IN RCRD: CPT | Performed by: NURSE PRACTITIONER

## 2025-02-25 NOTE — PROGRESS NOTES
Patient Name: Sandro Galindo   YOB: 1950  Referring Primary Care Physician: Sheila Sandoval MD      Chief Complaint:    Chief Complaint   Patient presents with    Neck - Follow-up            HPI:  Sandro Galindo is a 75 y.o. male who presents to [facility] for annual follow-up of neck pain.  Since last visit, he has had no significant flareups.  Denies any swallowing difficulties.  Denies imbalance or incoordination.  He has been able to continue enjoyable activities such as golfing.  Secondary complaint of a 3 to 4-day episode of right low back pain.  No radicular symptoms.    PFSH:  See attached    ROS: As per HPI, otherwise negative    Objective:      75 y.o. male  Body mass index is 25 kg/m²., 79 kg (174 lb 3.2 oz), @HT@  Vitals:    02/25/25 0757   Temp: 97.3 °F (36.3 °C)     Pain Score    02/25/25 0757   PainSc: 0-No pain   PainLoc: Neck            Spine Musculoskeletal Exam    Gait    Gait is normal.    Inspection    Coronal balance: no imbalance    Sagittal balance: no imbalance    Range of Motion    Cervical Spine       Cervical flexion: chin to chest. Cervical flexion detail: crepitus.     Cervical extension: reduced extension (0-25%). Cervical extension detail: crepitus.       Right      Lateral bending: reduced bend (26-50%). Lateral bending detail: crepitus.       Lateral rotation: reduced rotation (0-25%).       Left      Lateral bending: reduced bend (26-50%). Lateral bending detail: crepitus.       Lateral rotation: reduced rotation (0-25%).      Strength    Cervical Spine      Right      Deltoid: 5/5.       Biceps: 5/5.       Triceps: 5/5.       Left      Deltoid: 5/5.       Biceps: 5/5.       Triceps: 5/5.     Thoracolumbar    Thoracolumbar motor exam is normal.       Sensory    Cervical Spine    Cervical spine sensation is normal.    Thoracolumbar    Thoracolumbar sensation is normal.    Reflexes    Right      Quadriceps: 2/4      Achilles: 1/4      Left      Quadriceps: 2/4      Achilles: 1/4    General      Constitutional: well-developed and well-nourished    Scleral icterus: no    Labored breathing: no    Neurological: alert and oriented x3    Skin: intact        IMAGING:     Indication: pain related symptoms,  Views: 2V AP&LAT cervical   Findings: Mild retrolisthesis C3 on C4 disc osteophyte formations at C4-5 through C6-7, multilevel facet degenerative change, may mild anterolisthesis of C7 on T1.  Comparison views: No significant change from 02/27/2024      Official radiology interpretation will follow and be available in the patient medical records. Patient will be notified of any pertinent discrepancies.  Addendum 02/26/2025  There is mild retrolisthesis of C3 on C4. C3-4 disc space narrowing is seen. C5-6 and  C6-7 disc space narrowing is seen. Prevertebral soft tissues appear  normal. No fractures are seen.  Assessment:           There are no diagnoses linked to this encounter.         Plan:  For the neck pain, he has been fairly stable over the past year.  Still no myelopathy or loss of nerve function on exam and no radiculopathy.  He is able to maintain a normal lifestyle.  Will plan on follow-up in 1 year.  For the episodic right low back pain, he was advised that if this returns or worsens, he can notify the office at that point we will assess that in more detail but currently no loss of nerve function in the lower extremities and negative straight leg raise test.  For now he is advised to remain active and follow-up in 1 year, sooner if things change.      Return in about 1 year (around 2/25/2026).    EMR Dragon/Transcription Disclaimer:   Much of this encounter note is an electronic transcription/translation of spoken language to printed text. The electronic translation of spoken language may permit erroneous, or at times, nonsensical words or phrases to be inadvertently transcribed; Although I have reviewed the note for such errors, some may  still exist.  Red flags have been discussed at this or previous visits to include but not limited weakness in extremities, worsening pain that does not respond to conservative treatment and bowel or bladder dysfunction. These are reasons to present to ER and patient has been informed.    The diagnosis(es), natural history, pathophysiology and treatment for diagnosis(es) were discussed. Opportunity given and questions answered. Biomechanics of pertinent body areas discussed.    EXERCISES:  Advice on benefits of, and types of regular/moderate exercise pertaining to diagnosis.  Continue HEP. For back or neck pain, recommend pilates and or yoga as tolerated. Generally it is best to start any new exercise under the guidance of a  or therapist.   MEDICATIONS:  When prescribe, the risks, benefits, warnings,side effects and alternatives of medications discussed. Advised against long term use of narcotics.   PAIN CONTROL:  Cold, heat, OTC lidocaine patches and/or ointment as needed. Avoid direct skin contact with ice. Ice 15-20 minutes 3-4 times daily as needed. For SI joint pain, recommend ice bath in water about 50 degrees for 5 consecutive days, add ice slowly to help with adjustment and may cover with warm towel or robe to help with cold tolerance. If using lidocaine, do not apply heat in conjunction as this can cause a burn.   MEDICAL RECORDS reviewed from other provider(s) for past and current medical history pertinent to this visit..

## 2025-06-23 NOTE — PROGRESS NOTES
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 He had a CT angiogram performed on October 26, 2022 due to periodic episodes of a tightness feeling in his lower chest and upper abdomen. There was no consistency to this. The CT demonstrated moderate to severe stenosis in the celiac trunk. The SMA, left renal artery, and SARAVANAN were totally normal.  Question of a 4 mm right renal artery aneurysm.  Other than the tortuosity, the right renal artery was normal. I felt there was angulation of the celiac trunk but there did not appear to be significant stenosis, only about 50%. There was no aneurysm involving the right renal artery just tortuosity of the branches distally. He returned on September 27, 2023 with a mesenteric artery duplex scan. This demonstrated greater than 70% stenosis in both the celiac trunk and SMA, and it was felt that the increased velocities  were from angulation. He remains totally asymptomatic.  He returned on June 26, 2024 with a mesenteric artery duplex scan.  Again, increased velocities in the celiac trunk and SMA, greater than 70% stenosis by velocity criteria.  SARAVANAN patent.  It was felt his stenosis was related to tortuosity, and he was asymptomatic.  He returned in follow-up on July 2, 2025,  with a mesenteric artery duplex scan.  This demonstrated patent celiac trunk, SMA, and SARAVANAN with elevated velocities consistent with greater than 70% stenosis but on imaging stenosis appears to be significantly less. Unchanged.  Renal arteries patent without stenosis.  The only abdominal issue that he has is mild intolerance to dairy products.          Past History:  Medical History: has a past medical history of Aneurysm of renal artery, Arthritis, Arthritis of  "back, Arthritis of neck (2016), Chronic vascular disorder of intestine, Degeneration of intervertebral disc at C5-C6 level (07/06/2018), Osgood-Schlatter's disease (1960), Osteoarthritis (7/6/2018), Stricture of artery, and Vitamin D deficiency.   Surgical History: has a past surgical history that includes Colonoscopy; Cyst Removal; Colonoscopy (N/A, 05/25/2018); and Eye surgery (05/21/24).   Family History: family history includes Cancer in his brother, father, and mother; Diabetes in his mother; No Known Problems in his brother, brother, brother, brother, brother, and brother.   Social History: reports that he has never smoked. He has never been exposed to tobacco smoke. He has never used smokeless tobacco. He reports that he does not currently use alcohol after a past usage of about 1.0 standard drink of alcohol per week. He reports that he does not use drugs.    (Not in a hospital admission)     Allergies: Sulfa antibiotics and Misc. sulfonamide containing compounds   Objective   Vital Signs:  /66   Pulse (!) 47   Ht 177.8 cm (70\")   Wt 74.6 kg (164 lb 6.4 oz)   BMI 23.59 kg/m²   Estimated body mass index is 23.59 kg/m² as calculated from the following:    Height as of this encounter: 177.8 cm (70\").    Weight as of this encounter: 74.6 kg (164 lb 6.4 oz).     BMI is >= 25 and <30. (Overweight) The following options were offered after discussion;: Nothing needed    Sandro Galindo  reports that he has never smoked. He has never been exposed to tobacco smoke. He has never used smokeless tobacco.              Physical Exam   Result Review :                     Assessment and Plan     Diagnoses and all orders for this visit:    1. Celiac artery stenosis (Primary)  -     Duplex Mesenteric Complete CAR; Future    2. Celiac axis compression syndrome  -     Duplex Mesenteric Complete CAR; Future    He continues to do satisfactorily with patent visceral vessels and no evidence of significant stenosis despite " the fact that his velocities are elevated, indicative of greater than 70%.  He remains asymptomatic.  Nothing further needed.  Will continue to monitor probably we can go 2 years before assessing again.         Follow Up     Return in about 2 years (around 7/2/2027) for With mesenteric artery duplex scan.  Patient was given instructions and counseling regarding his condition or for health maintenance advice. Please see specific information pulled into the AVS if appropriate.

## 2025-07-02 ENCOUNTER — OFFICE VISIT (OUTPATIENT)
Age: 75
End: 2025-07-02
Payer: MEDICARE

## 2025-07-02 ENCOUNTER — HOSPITAL ENCOUNTER (OUTPATIENT)
Facility: HOSPITAL | Age: 75
Discharge: HOME | End: 2025-07-02
Admitting: SURGERY
Payer: MEDICARE

## 2025-07-02 VITALS
HEIGHT: 70 IN | DIASTOLIC BLOOD PRESSURE: 66 MMHG | WEIGHT: 164.4 LBS | BODY MASS INDEX: 23.54 KG/M2 | HEART RATE: 47 BPM | SYSTOLIC BLOOD PRESSURE: 162 MMHG

## 2025-07-02 DIAGNOSIS — I77.4 CELIAC ARTERY STENOSIS: ICD-10-CM

## 2025-07-02 DIAGNOSIS — K55.1: ICD-10-CM

## 2025-07-02 DIAGNOSIS — I77.4 CELIAC ARTERY STENOSIS: Primary | ICD-10-CM

## 2025-07-02 DIAGNOSIS — I77.4: ICD-10-CM

## 2025-07-02 LAB
BH CV ECHO MEAS - PROX REN A EDV LEFT: -22 CM/S
BH CV ECHO MEAS - PROX REN A PSV LEFT: -134 CM/S
BH CV VAS SMA AORTA DIAMETER: 2.4 CM
BH CV VAS SMA AORTA EDV: 18.5 CM/S
BH CV VAS SMA AORTA PSV: 77.9 CM/S
BH CV VAS SMA CELIAC DIST EDV: 157 CM/S
BH CV VAS SMA CELIAC DIST PSV: 555 CM/S
BH CV VAS SMA CELIAC PROX EDV: 42.5 CM/S
BH CV VAS SMA CELIAC PROX PSV: 226 CM/S
BH CV VAS SMA HEPATIC EDV: 47.8 CM/S
BH CV VAS SMA HEPATIC PSV: 180 CM/S
BH CV VAS SMA IMA EDV: 30.6 CM/S
BH CV VAS SMA IMA PSV: 279 CM/S
BH CV VAS SMA ORIGIN EDV: 47.8 CM/S
BH CV VAS SMA ORIGIN PSV: 313.5 CM/S
BH CV VAS SMA SMA DIST EDV: 40.1 CM/S
BH CV VAS SMA SMA DIST PSV: 239 CM/S
BH CV VAS SMA SMA MID EDV: 36.3 CM/S
BH CV VAS SMA SMA MID PSV: 210 CM/S
BH CV VAS SMA SMA PROX EDV: 34.4 CM/S
BH CV VAS SMA SMA PROX PSV: 279.1 CM/S
BH CV VAS SMA SPLENIC EDV: 32.5 CM/S
BH CV VAS SMA SPLENIC PSV: 195 CM/S
BH CV XLRA MEAS PROX REN A EDV RIGHT: -26.4 CM/S
BH CV XLRA MEAS PROX REN A PSV RIGHT: -136.2 CM/S

## 2025-07-02 PROCEDURE — 93975 VASCULAR STUDY: CPT

## (undated) DEVICE — CANNULA,ADULT,SOFT-TOUCH,7'TUBE,UC: Brand: PENDING

## (undated) DEVICE — TUBING, SUCTION, 1/4" X 10', STRAIGHT: Brand: MEDLINE

## (undated) DEVICE — THE TORRENT IRRIGATION SCOPE CONNECTOR IS USED WITH THE TORRENT IRRIGATION TUBING TO PROVIDE IRRIGATION FLUIDS SUCH AS STERILE WATER DURING GASTROINTESTINAL ENDOSCOPIC PROCEDURES WHEN USED IN CONJUNCTION WITH AN IRRIGATION PUMP (OR ELECTROSURGICAL UNIT).: Brand: TORRENT

## (undated) DEVICE — Device: Brand: DEFENDO AIR/WATER/SUCTION AND BIOPSY VALVE